# Patient Record
Sex: FEMALE | Race: WHITE | NOT HISPANIC OR LATINO
[De-identification: names, ages, dates, MRNs, and addresses within clinical notes are randomized per-mention and may not be internally consistent; named-entity substitution may affect disease eponyms.]

---

## 2019-08-30 ENCOUNTER — APPOINTMENT (OUTPATIENT)
Dept: ORTHOPEDIC SURGERY | Facility: CLINIC | Age: 64
End: 2019-08-30
Payer: MEDICARE

## 2019-08-30 VITALS
BODY MASS INDEX: 24.8 KG/M2 | SYSTOLIC BLOOD PRESSURE: 172 MMHG | DIASTOLIC BLOOD PRESSURE: 90 MMHG | HEART RATE: 66 BPM | HEIGHT: 63 IN | WEIGHT: 140 LBS

## 2019-08-30 PROCEDURE — 99204 OFFICE O/P NEW MOD 45 MIN: CPT

## 2019-08-30 NOTE — ADDENDUM
[FreeTextEntry1] :  This note was authored by Viridiana Massey working as a medical scribe for Dr. Sriram Pedro. The note was reviewed, edited, and revised by Dr. Sriram Pedro whom is in agreement with the exam findings, imaging findings, and treatment plan. 08/14/2019.

## 2019-08-30 NOTE — PHYSICAL EXAM
[UE/LE] : Sensory: Intact in bilateral upper & lower extremities [ALL] : dorsalis pedis, posterior tibial, femoral, popliteal, and radial 2+ and symmetric bilaterally [Normal] : Oriented to person, place, and time, insight and judgement were intact and the affect was normal [Antalgic] : antalgic [Poor Appearance] : well-appearing [Acute Distress] : not in acute distress [de-identified] : 5 out of 5 motor strength, sensation is intact and symmetrical full range of motion flexion extension and rotation, no palpatory tenderness full range of motion of hips knees shoulders and elbows (all four extremities), no atrophy, negative straight leg raise, no pathological reflexes, no swelling, normal ambulation, no apparent distress skin is intact, no palpable lymph nodes, no upper or lower extremity instability, alert and oriented x3 and normal mood. Normal finger-to nose test. Cane. [de-identified] : MRI Lumbar on 08/22/2019 at [facility]: Slight retrolisthesis at L2-3 and L3-4 and multilevel degenerative disc disease with multiple disc herniations resulting in impingement upon the left exiting L2 nerve root at L2-3, impinging upon the left L4 and left exiting L3 nerve roots at L3-4, impingement upon the left L5 nerve root with encroachment upon the left exiting L4 nerve root at L4-5 and encroachment upon the left exiting L5-S1 with asymmetric left neural foraminal narrowing throughout the lumbar spine without acute fracture or pars defect. \par 2. Neural foraminal narrowing appears most severe on the left at L3-4.

## 2019-08-30 NOTE — DISCUSSION/SUMMARY
[Surgical risks reviewed] : Surgical risks reviewed [de-identified] : Left L3-4 herniation\par We discussed all options. \par wants SNRB\par if no better surgery\par Risks of surgery include infection, dural tear, nerve root injury, reherniation, future back pain, future leg pain, retained fragment, hematoma, urinary retention, worsening leg symptoms, footdrop, anesthetic risks, blood transfusion risks, positioning pain, visceral and vascular injury, deep vein thrombosis, pulmonary embolus, and death. All risks were explained not exclusive to the ones mentioned alternatives were discussed and all questions were answered the patient agrees and understands the above and is in complete agreement with the plan. \par All options discussed including rest, medicine, home exercise, acupuncture, Chiropractic care, Physical Therapy, Pain management, and last resort surgery. \par All questions were answered, all alternatives discussed and the patient is in complete agreement with that plan. Follow-up appointment as instructed. Any issues and the patient will call or come in sooner.

## 2019-08-30 NOTE — HISTORY OF PRESENT ILLNESS
[Improving] : improving [de-identified] : 64 year old female c/o \par Had MRI at [facility] 08/22/2019. \par No fever chills sweats nausea vomiting no bowel or bladder dysfunction, no recent weight loss or gain no night pain. This history is in addition to the intake form that I personally reviewed. \par left thigh pain-1 month\par Avascular necrosis hips and shoulder from steroids-Chron's Disease.\par Dr. Meng-one injection.\par was in wheelchair now cane

## 2019-09-16 ENCOUNTER — APPOINTMENT (OUTPATIENT)
Dept: ORTHOPEDIC SURGERY | Facility: CLINIC | Age: 64
End: 2019-09-16
Payer: MEDICARE

## 2019-09-16 VITALS
BODY MASS INDEX: 24.8 KG/M2 | HEART RATE: 76 BPM | DIASTOLIC BLOOD PRESSURE: 77 MMHG | HEIGHT: 63 IN | WEIGHT: 140 LBS | SYSTOLIC BLOOD PRESSURE: 161 MMHG

## 2019-09-16 PROCEDURE — 99214 OFFICE O/P EST MOD 30 MIN: CPT

## 2019-09-16 NOTE — DISCUSSION/SUMMARY
[Surgical risks reviewed] : Surgical risks reviewed [de-identified] : Left L3-4 herniation.\par getting better\par wants another injection\par We discussed all options. \par wants SNRB\par if no better surgery\par Risks of surgery include infection, dural tear, nerve root injury, reherniation, future back pain, future leg pain, retained fragment, hematoma, urinary retention, worsening leg symptoms, footdrop, anesthetic risks, blood transfusion risks, positioning pain, visceral and vascular injury, deep vein thrombosis, pulmonary embolus, and death. All risks were explained not exclusive to the ones mentioned alternatives were discussed and all questions were answered the patient agrees and understands the above and is in complete agreement with the plan. \par wants another injection\par All options discussed including rest, medicine, home exercise, acupuncture, Chiropractic care, Physical Therapy, Pain management, and last resort surgery. \par All questions were answered, all alternatives discussed and the patient is in complete agreement with that plan. Follow-up appointment as instructed. Any issues and the patient will call or come in sooner.

## 2019-09-16 NOTE — HISTORY OF PRESENT ILLNESS
[Improving] : improving [de-identified] : 64 year old female c/o \par Had SNRB injection with Dr. Roman x 1 much better.\par Will be going in for another injection this week\par No fever chills sweats nausea vomiting no bowel or bladder dysfunction, no recent weight loss or gain no night pain. This history is in addition to the intake form that I personally reviewed. 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 Meng-one injection.\par was in wheelchair no cane anymore\par No fever chills sweats nausea vomiting no bowel or bladder dysfunction, no recent weight loss or gain no night pain. This history is in addition to the intake form that I personally reviewed.

## 2019-09-16 NOTE — PHYSICAL EXAM
[Antalgic] : antalgic [UE/LE] : Sensory: Intact in bilateral upper & lower extremities [ALL] : dorsalis pedis, posterior tibial, femoral, popliteal, and radial 2+ and symmetric bilaterally [Normal] : Gait: normal [SLR] : negative straight leg raise [Poor Appearance] : well-appearing [Acute Distress] : not in acute distress [de-identified] : 5 out of 5 motor strength, sensation is intact and symmetrical full range of motion flexion extension and rotation, no palpatory tenderness full range of motion of hips knees shoulders and elbows (all four extremities), no atrophy, negative straight leg raise, no pathological reflexes, no swelling, normal ambulation, no apparent distress skin is intact, no palpable lymph nodes, no upper or lower extremity instability, alert and oriented x3 and normal mood. Normal finger-to nose test.  [de-identified] : MRI Lumbar on 08/22/2019 at [facility]: Slight retrolisthesis at L2-3 and L3-4 and multilevel degenerative disc disease with multiple disc herniations resulting in impingement upon the left exiting L2 nerve root at L2-3, impinging upon the left L4 and left exiting L3 nerve roots at L3-4, impingement upon the left L5 nerve root with encroachment upon the left exiting L4 nerve root at L4-5 and encroachment upon the left exiting L5-S1 with asymmetric left neural foraminal narrowing throughout the lumbar spine without acute fracture or pars defect. \par 2. Neural foraminal narrowing appears most severe on the left at L3-4.

## 2019-09-23 ENCOUNTER — APPOINTMENT (OUTPATIENT)
Dept: ORTHOPEDIC SURGERY | Facility: CLINIC | Age: 64
End: 2019-09-23
Payer: MEDICARE

## 2019-09-23 VITALS — DIASTOLIC BLOOD PRESSURE: 80 MMHG | HEART RATE: 74 BPM | SYSTOLIC BLOOD PRESSURE: 157 MMHG

## 2019-09-23 PROCEDURE — 99214 OFFICE O/P EST MOD 30 MIN: CPT

## 2019-09-23 PROCEDURE — 72100 X-RAY EXAM L-S SPINE 2/3 VWS: CPT

## 2019-09-23 RX ORDER — PANTOPRAZOLE 40 MG/1
40 TABLET, DELAYED RELEASE ORAL
Qty: 90 | Refills: 0 | Status: ACTIVE | COMMUNITY
Start: 2019-08-12

## 2019-09-23 RX ORDER — METOPROLOL SUCCINATE 50 MG/1
50 TABLET, EXTENDED RELEASE ORAL
Qty: 90 | Refills: 0 | Status: ACTIVE | COMMUNITY
Start: 2019-07-11

## 2019-09-23 RX ORDER — ALPRAZOLAM 0.5 MG/1
0.5 TABLET ORAL
Qty: 90 | Refills: 0 | Status: ACTIVE | COMMUNITY
Start: 2019-07-10

## 2019-10-07 ENCOUNTER — APPOINTMENT (OUTPATIENT)
Dept: ORTHOPEDIC SURGERY | Facility: CLINIC | Age: 64
End: 2019-10-07
Payer: MEDICARE

## 2019-10-07 ENCOUNTER — APPOINTMENT (OUTPATIENT)
Dept: ORTHOPEDIC SURGERY | Facility: CLINIC | Age: 64
End: 2019-10-07

## 2019-10-07 VITALS — SYSTOLIC BLOOD PRESSURE: 136 MMHG | DIASTOLIC BLOOD PRESSURE: 74 MMHG | HEART RATE: 75 BPM

## 2019-10-07 PROCEDURE — 99214 OFFICE O/P EST MOD 30 MIN: CPT

## 2019-10-07 NOTE — DISCUSSION/SUMMARY
[Surgical risks reviewed] : Surgical risks reviewed [de-identified] : lumbar radiculopathy left\par surgery discussed\par left L4 radiculopathy\par She has a disc herniation on the left at L3-4 as well impinging the L3 nerve root.\par She failed all conservative treatment including medications, injections, and therapy.\par She is a nonsmoker.\par The herniation is far lateral underneath the facet joint and a facetectomy will be needed to decompress the neural elements and therefore fusion would be indicated.\par Surgery will entail a lumbar decompressive procedure and fusion with possible instrumentation, local autograft bone, and demineralized bone matrix. Spinal cord monitoring will be used.\par Risks of surgery include infection, dural tear, nerve root injury, reherniation, future back pain, future leg pain, retained fragment, hematoma, urinary retention, worsening leg symptoms, footdrop, inability to place hardware, hardware breakage, nonunion, adjacent level breakdown requiring surgery, anesthetic risks, blood transfusion risks, positioning pain, visceral and vascular injury, deep vein thrombosis, pulmonary embolus, and death. Somatosensory evoked potentials and EMG monitoring will be used. Patient will need spinal orthosis pre and post operatively. All risks were explained not exclusive to the ones mentioned alternatives were discussed and all questions were answered the patient agrees and understands the above and is in complete agreement with the plan. \par risks discussed\par website given and reviewed\par Surgeries tentatively scheduled October 22.\par Her  agrees with the plan.\par Risks of surgery include infection, dural tear, nerve root injury, reherniation, future back pain, future leg pain, retained fragment, hematoma, urinary retention, worsening leg symptoms, footdrop, anesthetic risks, blood transfusion risks, positioning pain, visceral and vascular injury, deep vein thrombosis, pulmonary embolus, and death. All risks were explained not exclusive to the ones mentioned alternatives were discussed and all questions were answered the patient agrees and understands the above and is in complete agreement with the plan.

## 2019-10-07 NOTE — CONSULT LETTER
[Dear  ___] : Dear  [unfilled], [Please see my note below.] : Please see my note below. [Sincerely,] : Sincerely, [FreeTextEntry3] : Dr. Bonilla

## 2019-10-07 NOTE — HISTORY OF PRESENT ILLNESS
[Worsening] : worsening [de-identified] : 64 year old female returns for review lumbar MRI \par Had SNRB injection with Dr. Roman x 2 -- most recently 3 weeks ago\par First injection was very helpful and the most recent injection was not.\par She's miserable and worsening with a decreased quality of life and limping 82 left thigh pain.\par Still with numbness and pain in left anterior thigh and slightly below the knee \par No fever chills sweats nausea vomiting no bowel or bladder dysfunction, no recent weight loss or gain no night pain. This history is in addition to the intake form that I personally reviewed. 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 Meng-one injection.\par was in wheelchair no cane anymore\par No fever chills sweats nausea vomiting no bowel or bladder dysfunction, no recent weight loss or gain no night pain. This history is in addition to the intake form that I personally reviewed.

## 2019-10-07 NOTE — ADDENDUM
[FreeTextEntry1] : I, Moises Ruiz, acted solely as a scribe for Dr. Pedro on 10/07/2019  .\par  All medical record entries made by the scribe were at my, Dr. Pedro, direction and personally dictated by me on 10/07/2019. I have reviewed the chart and agree that the record accurately reflects my personal performance of the history, physical exam, assessment and plan. I have also personally directed, reviewed, and agreed with the chart.\par

## 2019-10-07 NOTE — PHYSICAL EXAM
[Antalgic] : antalgic [de-identified] : Decreased left patellar reflex with weakness in her left quadriceps otherwise, 5 out of 5 motor strength, sensation is intact and symmetrical full range of motion flexion extension and rotation, no palpatory tenderness full range of motion of hips knees shoulders and elbows (all four extremities), no atrophy, negative straight leg raise, no pathological reflexes, no swelling, normal ambulation, no apparent distress skin is intact, no palpable lymph nodes, no upper or lower extremity instability, alert and oriented x3 and normal mood. Normal finger-to nose test.\par  [de-identified] : MRI 8/22/2019\par 1) slight retrolisthesis at L2/L3 and L3/L4 and multilevel DDD with multiple disc herniations resulting in impingement upon the left exiting L2 nerve root at L2/L3, impingement upon the left L4 and left exiting L3 nerve roots at L3/L4, impingement upon the left L5 nerve root with encroachment upon the left exiting L5 nerve root at L5/S1 with asymmetric left neural foraminal narrowing throughout the lumbar spine without acute fx on pars defect. \par 2) neural foraminal narrowing appears most sever on the left at L3/L4 \par AP lateral lumbar shows no obvious instability she does have a mild scoliosis-reviewed with the patient.

## 2019-10-08 ENCOUNTER — OUTPATIENT (OUTPATIENT)
Dept: OUTPATIENT SERVICES | Facility: HOSPITAL | Age: 64
LOS: 1 days | End: 2019-10-08
Payer: MEDICARE

## 2019-10-08 VITALS
HEART RATE: 66 BPM | SYSTOLIC BLOOD PRESSURE: 130 MMHG | DIASTOLIC BLOOD PRESSURE: 82 MMHG | WEIGHT: 139.99 LBS | TEMPERATURE: 97 F | HEIGHT: 61 IN | RESPIRATION RATE: 16 BRPM

## 2019-10-08 DIAGNOSIS — M48.07 SPINAL STENOSIS, LUMBOSACRAL REGION: ICD-10-CM

## 2019-10-08 DIAGNOSIS — Z96.612 PRESENCE OF LEFT ARTIFICIAL SHOULDER JOINT: Chronic | ICD-10-CM

## 2019-10-08 DIAGNOSIS — M54.9 DORSALGIA, UNSPECIFIED: ICD-10-CM

## 2019-10-08 DIAGNOSIS — Z96.649 PRESENCE OF UNSPECIFIED ARTIFICIAL HIP JOINT: Chronic | ICD-10-CM

## 2019-10-08 DIAGNOSIS — Z90.721 ACQUIRED ABSENCE OF OVARIES, UNILATERAL: Chronic | ICD-10-CM

## 2019-10-08 LAB
ANION GAP SERPL CALC-SCNC: 14 MMO/L — SIGNIFICANT CHANGE UP (ref 7–14)
BLD GP AB SCN SERPL QL: NEGATIVE — SIGNIFICANT CHANGE UP
BUN SERPL-MCNC: 22 MG/DL — SIGNIFICANT CHANGE UP (ref 7–23)
CALCIUM SERPL-MCNC: 9.5 MG/DL — SIGNIFICANT CHANGE UP (ref 8.4–10.5)
CHLORIDE SERPL-SCNC: 102 MMOL/L — SIGNIFICANT CHANGE UP (ref 98–107)
CO2 SERPL-SCNC: 23 MMOL/L — SIGNIFICANT CHANGE UP (ref 22–31)
CREAT SERPL-MCNC: 0.78 MG/DL — SIGNIFICANT CHANGE UP (ref 0.5–1.3)
GLUCOSE SERPL-MCNC: 81 MG/DL — SIGNIFICANT CHANGE UP (ref 70–99)
HCT VFR BLD CALC: 43.4 % — SIGNIFICANT CHANGE UP (ref 34.5–45)
HGB BLD-MCNC: 12.7 G/DL — SIGNIFICANT CHANGE UP (ref 11.5–15.5)
MCHC RBC-ENTMCNC: 26.7 PG — LOW (ref 27–34)
MCHC RBC-ENTMCNC: 29.3 % — LOW (ref 32–36)
MCV RBC AUTO: 91.2 FL — SIGNIFICANT CHANGE UP (ref 80–100)
NRBC # FLD: 0 K/UL — SIGNIFICANT CHANGE UP (ref 0–0)
PLATELET # BLD AUTO: 233 K/UL — SIGNIFICANT CHANGE UP (ref 150–400)
PMV BLD: 11.5 FL — SIGNIFICANT CHANGE UP (ref 7–13)
POTASSIUM SERPL-MCNC: 3.9 MMOL/L — SIGNIFICANT CHANGE UP (ref 3.5–5.3)
POTASSIUM SERPL-SCNC: 3.9 MMOL/L — SIGNIFICANT CHANGE UP (ref 3.5–5.3)
RBC # BLD: 4.76 M/UL — SIGNIFICANT CHANGE UP (ref 3.8–5.2)
RBC # FLD: 14.5 % — SIGNIFICANT CHANGE UP (ref 10.3–14.5)
RH IG SCN BLD-IMP: POSITIVE — SIGNIFICANT CHANGE UP
SODIUM SERPL-SCNC: 139 MMOL/L — SIGNIFICANT CHANGE UP (ref 135–145)
WBC # BLD: 6.14 K/UL — SIGNIFICANT CHANGE UP (ref 3.8–10.5)
WBC # FLD AUTO: 6.14 K/UL — SIGNIFICANT CHANGE UP (ref 3.8–10.5)

## 2019-10-08 PROCEDURE — 93010 ELECTROCARDIOGRAM REPORT: CPT

## 2019-10-08 RX ORDER — METOPROLOL TARTRATE 50 MG
1 TABLET ORAL
Qty: 0 | Refills: 0 | DISCHARGE

## 2019-10-08 RX ORDER — PANTOPRAZOLE SODIUM 20 MG/1
1 TABLET, DELAYED RELEASE ORAL
Qty: 0 | Refills: 0 | DISCHARGE

## 2019-10-08 NOTE — H&P PST ADULT - NSICDXPASTMEDICALHX_GEN_ALL_CORE_FT
PAST MEDICAL HISTORY:  Anxiety     AVN (avascular necrosis of bone)     Crohn's disease     GERD (gastroesophageal reflux disease)

## 2019-10-08 NOTE — H&P PST ADULT - NSICDXPROBLEM_GEN_ALL_CORE_FT
PROBLEM DIAGNOSES  Problem: Back pain  Assessment and Plan: Pt. is scheduled for a L4-5 laminectomy and fusion with instrumentation.  Pt. verbalized understanding of instructions as discussed and outlined on the instruction sheets.  Pt. did teach back on Chlorhexidine wash.  Pt. to have medical clearance.

## 2019-10-08 NOTE — H&P PST ADULT - GENITOURINARY MALE
Mod I / Independent - with use of device, as needed/appropriate   Balance Sitting: Good  (at EOB)  Standing: Fair  (without device)  Good dynamic standing balance during completion of ADLs and other functional tasks. Activity Tolerance Limited  Patient demonstrated shortness of breath following minimal functional mobility; O2 saturation was 89% following minimal activity and recovered to 92% with seated rest.  Patient will demonstrate Good understanding and consistent implementation of energy conservation techniques and work simplification techniques into ADL/IADL routines. Visual/  Perceptual Fair  Patient wears glasses for reading. N/A        Strength: ROM: Additional Information:    R UE  4-/5  WFL    L UE 4-/5  WFL      Hearing: WFL  Sensation: Patient denied experiencing numbness/tingling in B UEs. Tone: WFL  Edema: No    Comments/Treatment: Upon arrival, patient seated at EOB. At end of session, patient seated on couch (per patient preference) with all lines and tubes intact. Patient would benefit from continued skilled OT to increase safety and independence with completion of ADL/IADL tasks for functional independence and quality of life. Patient education provided regardin) energy conservation techniques. Patient indicated understanding. Eval Complexity: Low    Assessment of Current Deficits:   Functional Mobility ? ADLs ? Strength ? Cognition ? Functional Transfers  ? IADLs ? Safety Awareness ? Endurance ? Fine Motor Coordination ? Balance ? Vision/Perception ? Sensation ? Gross Motor Coordination ? ROM ? Delirium ? Motor Control ? Plan of Care:   ADL Retraining ? Equipment Needs ? Neuromuscular Re-Education ? Energy Conservation Techniques ? Functional Transfer Training ? Patient and/or Family Education ? Functional Mobility Training ? Environmental Modifications ? Cognitive Re-Training ? Compensatory Techniques for ADLs ? Splinting Needs ?    Positioning not applicable

## 2019-10-08 NOTE — H&P PST ADULT - NSICDXPASTSURGICALHX_GEN_ALL_CORE_FT
PAST SURGICAL HISTORY:  History of right oophorectomy 1985    History of total hip replacement right-9/23/2010, left-8/29/2018    S/P shoulder replacement, left 4/11/2019

## 2019-10-08 NOTE — H&P PST ADULT - NSANTHOSAYNRD_GEN_A_CORE
No. DINESH screening performed.  STOP BANG Legend: 0-2 = LOW Risk; 3-4 = INTERMEDIATE Risk; 5-8 = HIGH Risk

## 2019-10-10 LAB — SPECIMEN SOURCE: SIGNIFICANT CHANGE UP

## 2019-10-11 PROBLEM — F41.9 ANXIETY DISORDER, UNSPECIFIED: Chronic | Status: ACTIVE | Noted: 2019-10-08

## 2019-10-11 PROBLEM — K21.9 GASTRO-ESOPHAGEAL REFLUX DISEASE WITHOUT ESOPHAGITIS: Chronic | Status: ACTIVE | Noted: 2019-10-08

## 2019-10-11 PROBLEM — K50.90 CROHN'S DISEASE, UNSPECIFIED, WITHOUT COMPLICATIONS: Chronic | Status: ACTIVE | Noted: 2019-10-08

## 2019-10-11 LAB — BACTERIA NPH CULT: SIGNIFICANT CHANGE UP

## 2019-10-12 ENCOUNTER — TRANSCRIPTION ENCOUNTER (OUTPATIENT)
Age: 64
End: 2019-10-12

## 2019-10-21 NOTE — ASU PATIENT PROFILE, ADULT - PMH
Anxiety    AVN (avascular necrosis of bone)    Crohn's disease    GERD (gastroesophageal reflux disease)

## 2019-10-21 NOTE — ASU PATIENT PROFILE, ADULT - PSH
History of right oophorectomy  1985  History of total hip replacement  right-9/23/2010, left-8/29/2018  S/P shoulder replacement, left  4/11/2019

## 2019-10-21 NOTE — ASU PATIENT PROFILE, ADULT - VISION (WITH CORRECTIVE LENSES IF THE PATIENT USUALLY WEARS THEM):
wears eyeglasses for reading and driving Partially impaired: cannot see medication labels or newsprint, but can see obstacles in path, and the surrounding layout; can count fingers at arm's length/wears eyeglasses for reading and driving

## 2019-10-22 ENCOUNTER — INPATIENT (INPATIENT)
Facility: HOSPITAL | Age: 64
LOS: 0 days | Discharge: HOME | End: 2019-10-23
Attending: ORTHOPAEDIC SURGERY | Admitting: ORTHOPAEDIC SURGERY
Payer: MEDICARE

## 2019-10-22 ENCOUNTER — RESULT REVIEW (OUTPATIENT)
Age: 64
End: 2019-10-22

## 2019-10-22 ENCOUNTER — APPOINTMENT (OUTPATIENT)
Dept: ORTHOPEDIC SURGERY | Facility: HOSPITAL | Age: 64
End: 2019-10-22
Payer: MEDICARE

## 2019-10-22 VITALS
SYSTOLIC BLOOD PRESSURE: 130 MMHG | WEIGHT: 139.99 LBS | HEIGHT: 61 IN | DIASTOLIC BLOOD PRESSURE: 52 MMHG | HEART RATE: 65 BPM | OXYGEN SATURATION: 98 % | RESPIRATION RATE: 16 BRPM | TEMPERATURE: 98 F

## 2019-10-22 DIAGNOSIS — Z90.721 ACQUIRED ABSENCE OF OVARIES, UNILATERAL: Chronic | ICD-10-CM

## 2019-10-22 DIAGNOSIS — M48.07 SPINAL STENOSIS, LUMBOSACRAL REGION: ICD-10-CM

## 2019-10-22 DIAGNOSIS — Z96.612 PRESENCE OF LEFT ARTIFICIAL SHOULDER JOINT: Chronic | ICD-10-CM

## 2019-10-22 DIAGNOSIS — Z96.649 PRESENCE OF UNSPECIFIED ARTIFICIAL HIP JOINT: Chronic | ICD-10-CM

## 2019-10-22 LAB
ANION GAP SERPL CALC-SCNC: 11 MMO/L — SIGNIFICANT CHANGE UP (ref 7–14)
BASOPHILS # BLD AUTO: 0.01 K/UL — SIGNIFICANT CHANGE UP (ref 0–0.2)
BASOPHILS NFR BLD AUTO: 0.2 % — SIGNIFICANT CHANGE UP (ref 0–2)
BUN SERPL-MCNC: 14 MG/DL — SIGNIFICANT CHANGE UP (ref 7–23)
CALCIUM SERPL-MCNC: 8.9 MG/DL — SIGNIFICANT CHANGE UP (ref 8.4–10.5)
CHLORIDE SERPL-SCNC: 106 MMOL/L — SIGNIFICANT CHANGE UP (ref 98–107)
CO2 SERPL-SCNC: 23 MMOL/L — SIGNIFICANT CHANGE UP (ref 22–31)
CREAT SERPL-MCNC: 0.78 MG/DL — SIGNIFICANT CHANGE UP (ref 0.5–1.3)
EOSINOPHIL # BLD AUTO: 0.05 K/UL — SIGNIFICANT CHANGE UP (ref 0–0.5)
EOSINOPHIL NFR BLD AUTO: 1.2 % — SIGNIFICANT CHANGE UP (ref 0–6)
GLUCOSE SERPL-MCNC: 107 MG/DL — HIGH (ref 70–99)
HCT VFR BLD CALC: 36.7 % — SIGNIFICANT CHANGE UP (ref 34.5–45)
HGB BLD-MCNC: 11.3 G/DL — LOW (ref 11.5–15.5)
IMM GRANULOCYTES NFR BLD AUTO: 1.4 % — SIGNIFICANT CHANGE UP (ref 0–1.5)
LYMPHOCYTES # BLD AUTO: 1.54 K/UL — SIGNIFICANT CHANGE UP (ref 1–3.3)
LYMPHOCYTES # BLD AUTO: 36.1 % — SIGNIFICANT CHANGE UP (ref 13–44)
MCHC RBC-ENTMCNC: 27.5 PG — SIGNIFICANT CHANGE UP (ref 27–34)
MCHC RBC-ENTMCNC: 30.8 % — LOW (ref 32–36)
MCV RBC AUTO: 89.3 FL — SIGNIFICANT CHANGE UP (ref 80–100)
MONOCYTES # BLD AUTO: 0.31 K/UL — SIGNIFICANT CHANGE UP (ref 0–0.9)
MONOCYTES NFR BLD AUTO: 7.3 % — SIGNIFICANT CHANGE UP (ref 2–14)
NEUTROPHILS # BLD AUTO: 2.3 K/UL — SIGNIFICANT CHANGE UP (ref 1.8–7.4)
NEUTROPHILS NFR BLD AUTO: 53.8 % — SIGNIFICANT CHANGE UP (ref 43–77)
NRBC # FLD: 0 K/UL — SIGNIFICANT CHANGE UP (ref 0–0)
PLATELET # BLD AUTO: 199 K/UL — SIGNIFICANT CHANGE UP (ref 150–400)
PMV BLD: 11.1 FL — SIGNIFICANT CHANGE UP (ref 7–13)
POTASSIUM SERPL-MCNC: 3.9 MMOL/L — SIGNIFICANT CHANGE UP (ref 3.5–5.3)
POTASSIUM SERPL-SCNC: 3.9 MMOL/L — SIGNIFICANT CHANGE UP (ref 3.5–5.3)
RBC # BLD: 4.11 M/UL — SIGNIFICANT CHANGE UP (ref 3.8–5.2)
RBC # FLD: 14 % — SIGNIFICANT CHANGE UP (ref 10.3–14.5)
RH IG SCN BLD-IMP: POSITIVE — SIGNIFICANT CHANGE UP
SODIUM SERPL-SCNC: 140 MMOL/L — SIGNIFICANT CHANGE UP (ref 135–145)
WBC # BLD: 4.27 K/UL — SIGNIFICANT CHANGE UP (ref 3.8–10.5)
WBC # FLD AUTO: 4.27 K/UL — SIGNIFICANT CHANGE UP (ref 3.8–10.5)

## 2019-10-22 PROCEDURE — 88304 TISSUE EXAM BY PATHOLOGIST: CPT | Mod: 26

## 2019-10-22 PROCEDURE — 63030 LAMOT DCMPRN NRV RT 1 LMBR: CPT | Mod: 82,59

## 2019-10-22 PROCEDURE — 63047 LAM FACETEC & FORAMOT LUMBAR: CPT | Mod: 82

## 2019-10-22 PROCEDURE — 72100 X-RAY EXAM L-S SPINE 2/3 VWS: CPT | Mod: 26

## 2019-10-22 RX ORDER — ONDANSETRON 8 MG/1
8 TABLET, FILM COATED ORAL ONCE
Refills: 0 | Status: DISCONTINUED | OUTPATIENT
Start: 2019-10-22 | End: 2019-10-22

## 2019-10-22 RX ORDER — HYDROMORPHONE HYDROCHLORIDE 2 MG/ML
0.5 INJECTION INTRAMUSCULAR; INTRAVENOUS; SUBCUTANEOUS
Refills: 0 | Status: DISCONTINUED | OUTPATIENT
Start: 2019-10-22 | End: 2019-10-22

## 2019-10-22 RX ORDER — CEFAZOLIN SODIUM 1 G
2000 VIAL (EA) INJECTION EVERY 8 HOURS
Refills: 0 | Status: COMPLETED | OUTPATIENT
Start: 2019-10-22 | End: 2019-10-23

## 2019-10-22 RX ORDER — SODIUM CHLORIDE 9 MG/ML
1000 INJECTION, SOLUTION INTRAVENOUS
Refills: 0 | Status: DISCONTINUED | OUTPATIENT
Start: 2019-10-22 | End: 2019-10-22

## 2019-10-22 RX ORDER — INFLUENZA VIRUS VACCINE 15; 15; 15; 15 UG/.5ML; UG/.5ML; UG/.5ML; UG/.5ML
0.5 SUSPENSION INTRAMUSCULAR ONCE
Refills: 0 | Status: DISCONTINUED | OUTPATIENT
Start: 2019-10-22 | End: 2019-10-23

## 2019-10-22 RX ORDER — ALPRAZOLAM 0.25 MG
0.5 TABLET ORAL THREE TIMES A DAY
Refills: 0 | Status: DISCONTINUED | OUTPATIENT
Start: 2019-10-22 | End: 2019-10-23

## 2019-10-22 RX ORDER — ONDANSETRON 8 MG/1
4 TABLET, FILM COATED ORAL EVERY 6 HOURS
Refills: 0 | Status: DISCONTINUED | OUTPATIENT
Start: 2019-10-22 | End: 2019-10-23

## 2019-10-22 RX ORDER — ACETAMINOPHEN 500 MG
650 TABLET ORAL EVERY 6 HOURS
Refills: 0 | Status: DISCONTINUED | OUTPATIENT
Start: 2019-10-22 | End: 2019-10-23

## 2019-10-22 RX ORDER — OXYCODONE HYDROCHLORIDE 5 MG/1
5 TABLET ORAL ONCE
Refills: 0 | Status: DISCONTINUED | OUTPATIENT
Start: 2019-10-22 | End: 2019-10-22

## 2019-10-22 RX ORDER — OXYCODONE HYDROCHLORIDE 5 MG/1
10 TABLET ORAL EVERY 4 HOURS
Refills: 0 | Status: DISCONTINUED | OUTPATIENT
Start: 2019-10-22 | End: 2019-10-23

## 2019-10-22 RX ORDER — HYDROMORPHONE HYDROCHLORIDE 2 MG/ML
0.5 INJECTION INTRAMUSCULAR; INTRAVENOUS; SUBCUTANEOUS EVERY 4 HOURS
Refills: 0 | Status: DISCONTINUED | OUTPATIENT
Start: 2019-10-22 | End: 2019-10-23

## 2019-10-22 RX ORDER — OXYCODONE HYDROCHLORIDE 5 MG/1
5 TABLET ORAL EVERY 4 HOURS
Refills: 0 | Status: DISCONTINUED | OUTPATIENT
Start: 2019-10-22 | End: 2019-10-23

## 2019-10-22 RX ORDER — SODIUM CHLORIDE 9 MG/ML
1000 INJECTION, SOLUTION INTRAVENOUS
Refills: 0 | Status: DISCONTINUED | OUTPATIENT
Start: 2019-10-22 | End: 2019-10-23

## 2019-10-22 RX ORDER — FENTANYL CITRATE 50 UG/ML
50 INJECTION INTRAVENOUS
Refills: 0 | Status: DISCONTINUED | OUTPATIENT
Start: 2019-10-22 | End: 2019-10-22

## 2019-10-22 RX ORDER — MAGNESIUM HYDROXIDE 400 MG/1
30 TABLET, CHEWABLE ORAL EVERY 12 HOURS
Refills: 0 | Status: DISCONTINUED | OUTPATIENT
Start: 2019-10-22 | End: 2019-10-23

## 2019-10-22 RX ORDER — PANTOPRAZOLE SODIUM 20 MG/1
40 TABLET, DELAYED RELEASE ORAL
Refills: 0 | Status: DISCONTINUED | OUTPATIENT
Start: 2019-10-22 | End: 2019-10-23

## 2019-10-22 RX ORDER — METOPROLOL TARTRATE 50 MG
50 TABLET ORAL DAILY
Refills: 0 | Status: DISCONTINUED | OUTPATIENT
Start: 2019-10-22 | End: 2019-10-23

## 2019-10-22 RX ORDER — SENNA PLUS 8.6 MG/1
2 TABLET ORAL AT BEDTIME
Refills: 0 | Status: DISCONTINUED | OUTPATIENT
Start: 2019-10-22 | End: 2019-10-23

## 2019-10-22 RX ADMIN — HYDROMORPHONE HYDROCHLORIDE 0.5 MILLIGRAM(S): 2 INJECTION INTRAMUSCULAR; INTRAVENOUS; SUBCUTANEOUS at 16:32

## 2019-10-22 RX ADMIN — Medication 0.5 MILLIGRAM(S): at 23:13

## 2019-10-22 RX ADMIN — SODIUM CHLORIDE 125 MILLILITER(S): 9 INJECTION, SOLUTION INTRAVENOUS at 23:15

## 2019-10-22 RX ADMIN — OXYCODONE HYDROCHLORIDE 5 MILLIGRAM(S): 5 TABLET ORAL at 16:57

## 2019-10-22 RX ADMIN — HYDROMORPHONE HYDROCHLORIDE 0.5 MILLIGRAM(S): 2 INJECTION INTRAMUSCULAR; INTRAVENOUS; SUBCUTANEOUS at 17:13

## 2019-10-22 RX ADMIN — HYDROMORPHONE HYDROCHLORIDE 0.5 MILLIGRAM(S): 2 INJECTION INTRAMUSCULAR; INTRAVENOUS; SUBCUTANEOUS at 15:17

## 2019-10-22 RX ADMIN — SENNA PLUS 2 TABLET(S): 8.6 TABLET ORAL at 21:43

## 2019-10-22 RX ADMIN — HYDROMORPHONE HYDROCHLORIDE 0.5 MILLIGRAM(S): 2 INJECTION INTRAMUSCULAR; INTRAVENOUS; SUBCUTANEOUS at 16:28

## 2019-10-22 RX ADMIN — ONDANSETRON 4 MILLIGRAM(S): 8 TABLET, FILM COATED ORAL at 20:25

## 2019-10-22 RX ADMIN — Medication 100 MILLIGRAM(S): at 20:25

## 2019-10-22 RX ADMIN — OXYCODONE HYDROCHLORIDE 5 MILLIGRAM(S): 5 TABLET ORAL at 18:25

## 2019-10-22 NOTE — PATIENT PROFILE ADULT - NSPROSPIRITUALVALUESFT_GEN_A_NUR
Pt would prefer BP's to be taken on the Right hand vs to Lt hand d/t her Lt shoulder replacement surgery 6 months ago.

## 2019-10-22 NOTE — PROGRESS NOTE ADULT - SUBJECTIVE AND OBJECTIVE BOX
Patient seen and examined.   Surgical pain well controlled.   Denies numbness/tingling/paresthesias/weakness.   Denies headaches. Denies fevers/chills/CP,SOB/N/V.     HEALTH ISSUES - PROBLEM Dx:          MEDICATIONS  (STANDING):  ceFAZolin   IVPB 2000 milliGRAM(s) IV Intermittent every 8 hours  lactated ringers. 1000 milliLiter(s) IV Continuous <Continuous>  metoprolol succinate ER 50 milliGRAM(s) Oral daily  pantoprazole    Tablet 40 milliGRAM(s) Oral before breakfast  senna 2 Tablet(s) Oral at bedtime      Allergies    No Known Allergies    Intolerances        PAST MEDICAL & SURGICAL HISTORY:  Anxiety  GERD (gastroesophageal reflux disease)  Crohn's disease  AVN (avascular necrosis of bone)  History of right oophorectomy: 1985  S/P shoulder replacement, left: 4/11/2019  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, left-8/29/2018                            11.3   4.27  )-----------( 199      ( 22 Oct 2019 15:14 )             36.7       22 Oct 2019 15:14    140    |  106    |  14     ----------------------------<  107    3.9     |  23     |  0.78     Ca    8.9        22 Oct 2019 15:14                Vital Signs Last 24 Hrs  T(C): 36.3 (10-22-19 @ 14:35), Max: 36.9 (10-22-19 @ 10:13)  T(F): 97.3 (10-22-19 @ 14:35), Max: 98.4 (10-22-19 @ 10:13)  HR: 59 (10-22-19 @ 15:45) (59 - 67)  BP: 100/82 (10-22-19 @ 15:45) (99/64 - 130/52)  BP(mean): 87 (10-22-19 @ 15:45) (66 - 87)  RR: 19 (10-22-19 @ 15:45) (10 - 19)  SpO2: 100% (10-22-19 @ 15:45) (98% - 100%)    Gen: NAD    Spine PE:  Dressing clean dry intact      Motor:                             L2             L3             L4               L5            S1  R         5/5           5/5          5/5             5/5           5/5  L          5/5          5/5           5/5             5/5           5/5    Sensory:                         L2          L3         L4      L5       S1         (0=absent, 1=impaired, 2=normal, NT=not testable)  R         2            2            2        2        2  L          2            2           2        2         2      A/P: 65 y/o F s/p Discectomy.  Neurologically intact. No acute isssues.   Pain control  Neuro checks  WBAT/PT/OT/OOB  LSO with ambulation  FU Labs  PPI, bowel regimen  Inc spirometry  SCDs  Medical co-management appreciated  Dispo plan Patient seen and examined.   Surgical pain well controlled.   Denies numbness/tingling/paresthesias/weakness.   Denies headaches. Denies fevers/chills/CP,SOB/N/V.     HEALTH ISSUES - PROBLEM Dx:          MEDICATIONS  (STANDING):  ceFAZolin   IVPB 2000 milliGRAM(s) IV Intermittent every 8 hours  lactated ringers. 1000 milliLiter(s) IV Continuous <Continuous>  metoprolol succinate ER 50 milliGRAM(s) Oral daily  pantoprazole    Tablet 40 milliGRAM(s) Oral before breakfast  senna 2 Tablet(s) Oral at bedtime      Allergies    No Known Allergies    Intolerances        PAST MEDICAL & SURGICAL HISTORY:  Anxiety  GERD (gastroesophageal reflux disease)  Crohn's disease  AVN (avascular necrosis of bone)  History of right oophorectomy: 1985  S/P shoulder replacement, left: 4/11/2019  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, left-8/29/2018                            11.3   4.27  )-----------( 199      ( 22 Oct 2019 15:14 )             36.7       22 Oct 2019 15:14    140    |  106    |  14     ----------------------------<  107    3.9     |  23     |  0.78     Ca    8.9        22 Oct 2019 15:14                Vital Signs Last 24 Hrs  T(C): 36.3 (10-22-19 @ 14:35), Max: 36.9 (10-22-19 @ 10:13)  T(F): 97.3 (10-22-19 @ 14:35), Max: 98.4 (10-22-19 @ 10:13)  HR: 59 (10-22-19 @ 15:45) (59 - 67)  BP: 100/82 (10-22-19 @ 15:45) (99/64 - 130/52)  BP(mean): 87 (10-22-19 @ 15:45) (66 - 87)  RR: 19 (10-22-19 @ 15:45) (10 - 19)  SpO2: 100% (10-22-19 @ 15:45) (98% - 100%)    Gen: NAD    Spine PE:  Dressing clean dry intact      Motor:                             L2             L3             L4               L5            S1  R         5/5           5/5          5/5             5/5           5/5  L          5/5          5/5           5/5             5/5           5/5    Sensory:                         L2          L3         L4      L5       S1         (0=absent, 1=impaired, 2=normal, NT=not testable)  R         2            2            2        2        2  L          2            2           2        2         2      A/P: 65 y/o F s/p Discectomy.  Neurologically intact. No acute isssues.   Pain control  Neuro checks  WBAT/PT/OT/OOB  LSO with ambulation  FU Labs  PPI, bowel regimen  Inc spirometry  SCDs  Medical co-management appreciated  Dispo plan    I discussed and I agree with the above, Dr. Sriram Pedro.

## 2019-10-23 ENCOUNTER — TRANSCRIPTION ENCOUNTER (OUTPATIENT)
Age: 64
End: 2019-10-23

## 2019-10-23 ENCOUNTER — INBOUND DOCUMENT (OUTPATIENT)
Age: 64
End: 2019-10-23

## 2019-10-23 VITALS
OXYGEN SATURATION: 98 % | TEMPERATURE: 98 F | SYSTOLIC BLOOD PRESSURE: 104 MMHG | DIASTOLIC BLOOD PRESSURE: 51 MMHG | RESPIRATION RATE: 18 BRPM | HEART RATE: 64 BPM

## 2019-10-23 DIAGNOSIS — Z29.9 ENCOUNTER FOR PROPHYLACTIC MEASURES, UNSPECIFIED: ICD-10-CM

## 2019-10-23 DIAGNOSIS — M87.00 IDIOPATHIC ASEPTIC NECROSIS OF UNSPECIFIED BONE: ICD-10-CM

## 2019-10-23 DIAGNOSIS — K21.9 GASTRO-ESOPHAGEAL REFLUX DISEASE WITHOUT ESOPHAGITIS: ICD-10-CM

## 2019-10-23 DIAGNOSIS — M51.26 OTHER INTERVERTEBRAL DISC DISPLACEMENT, LUMBAR REGION: ICD-10-CM

## 2019-10-23 DIAGNOSIS — K50.919 CROHN'S DISEASE, UNSPECIFIED, WITH UNSPECIFIED COMPLICATIONS: ICD-10-CM

## 2019-10-23 DIAGNOSIS — F41.9 ANXIETY DISORDER, UNSPECIFIED: ICD-10-CM

## 2019-10-23 LAB
ANION GAP SERPL CALC-SCNC: 11 MMO/L — SIGNIFICANT CHANGE UP (ref 7–14)
BASOPHILS # BLD AUTO: 0.03 K/UL — SIGNIFICANT CHANGE UP (ref 0–0.2)
BASOPHILS NFR BLD AUTO: 0.4 % — SIGNIFICANT CHANGE UP (ref 0–2)
BUN SERPL-MCNC: 12 MG/DL — SIGNIFICANT CHANGE UP (ref 7–23)
CALCIUM SERPL-MCNC: 8.7 MG/DL — SIGNIFICANT CHANGE UP (ref 8.4–10.5)
CHLORIDE SERPL-SCNC: 105 MMOL/L — SIGNIFICANT CHANGE UP (ref 98–107)
CO2 SERPL-SCNC: 25 MMOL/L — SIGNIFICANT CHANGE UP (ref 22–31)
CREAT SERPL-MCNC: 0.84 MG/DL — SIGNIFICANT CHANGE UP (ref 0.5–1.3)
EOSINOPHIL # BLD AUTO: 0.12 K/UL — SIGNIFICANT CHANGE UP (ref 0–0.5)
EOSINOPHIL NFR BLD AUTO: 1.7 % — SIGNIFICANT CHANGE UP (ref 0–6)
GLUCOSE SERPL-MCNC: 107 MG/DL — HIGH (ref 70–99)
HCT VFR BLD CALC: 35.9 % — SIGNIFICANT CHANGE UP (ref 34.5–45)
HGB BLD-MCNC: 11 G/DL — LOW (ref 11.5–15.5)
IMM GRANULOCYTES NFR BLD AUTO: 0.6 % — SIGNIFICANT CHANGE UP (ref 0–1.5)
LYMPHOCYTES # BLD AUTO: 1.56 K/UL — SIGNIFICANT CHANGE UP (ref 1–3.3)
LYMPHOCYTES # BLD AUTO: 22.3 % — SIGNIFICANT CHANGE UP (ref 13–44)
MCHC RBC-ENTMCNC: 27.4 PG — SIGNIFICANT CHANGE UP (ref 27–34)
MCHC RBC-ENTMCNC: 30.6 % — LOW (ref 32–36)
MCV RBC AUTO: 89.5 FL — SIGNIFICANT CHANGE UP (ref 80–100)
MONOCYTES # BLD AUTO: 0.42 K/UL — SIGNIFICANT CHANGE UP (ref 0–0.9)
MONOCYTES NFR BLD AUTO: 6 % — SIGNIFICANT CHANGE UP (ref 2–14)
NEUTROPHILS # BLD AUTO: 4.84 K/UL — SIGNIFICANT CHANGE UP (ref 1.8–7.4)
NEUTROPHILS NFR BLD AUTO: 69 % — SIGNIFICANT CHANGE UP (ref 43–77)
NRBC # FLD: 0 K/UL — SIGNIFICANT CHANGE UP (ref 0–0)
PLATELET # BLD AUTO: 192 K/UL — SIGNIFICANT CHANGE UP (ref 150–400)
PMV BLD: 11.1 FL — SIGNIFICANT CHANGE UP (ref 7–13)
POTASSIUM SERPL-MCNC: 4.2 MMOL/L — SIGNIFICANT CHANGE UP (ref 3.5–5.3)
POTASSIUM SERPL-SCNC: 4.2 MMOL/L — SIGNIFICANT CHANGE UP (ref 3.5–5.3)
RBC # BLD: 4.01 M/UL — SIGNIFICANT CHANGE UP (ref 3.8–5.2)
RBC # FLD: 13.9 % — SIGNIFICANT CHANGE UP (ref 10.3–14.5)
SODIUM SERPL-SCNC: 141 MMOL/L — SIGNIFICANT CHANGE UP (ref 135–145)
WBC # BLD: 7.01 K/UL — SIGNIFICANT CHANGE UP (ref 3.8–10.5)
WBC # FLD AUTO: 7.01 K/UL — SIGNIFICANT CHANGE UP (ref 3.8–10.5)

## 2019-10-23 PROCEDURE — 99223 1ST HOSP IP/OBS HIGH 75: CPT

## 2019-10-23 RX ORDER — CYCLOBENZAPRINE HYDROCHLORIDE 10 MG/1
1 TABLET, FILM COATED ORAL
Qty: 42 | Refills: 0
Start: 2019-10-23 | End: 2019-11-05

## 2019-10-23 RX ORDER — SODIUM CHLORIDE 9 MG/ML
1000 INJECTION, SOLUTION INTRAVENOUS ONCE
Refills: 0 | Status: COMPLETED | OUTPATIENT
Start: 2019-10-23 | End: 2019-10-23

## 2019-10-23 RX ORDER — CYCLOBENZAPRINE HYDROCHLORIDE 10 MG/1
5 TABLET, FILM COATED ORAL THREE TIMES A DAY
Refills: 0 | Status: DISCONTINUED | OUTPATIENT
Start: 2019-10-23 | End: 2019-10-23

## 2019-10-23 RX ORDER — OXYCODONE HYDROCHLORIDE 5 MG/1
1 TABLET ORAL
Qty: 42 | Refills: 0
Start: 2019-10-23 | End: 2019-10-29

## 2019-10-23 RX ORDER — SENNA PLUS 8.6 MG/1
2 TABLET ORAL
Qty: 0 | Refills: 0 | DISCHARGE
Start: 2019-10-23

## 2019-10-23 RX ORDER — OXYCODONE HYDROCHLORIDE 5 MG/1
5 TABLET ORAL
Refills: 0 | Status: DISCONTINUED | OUTPATIENT
Start: 2019-10-23 | End: 2019-10-23

## 2019-10-23 RX ORDER — OXYCODONE HYDROCHLORIDE 5 MG/1
10 TABLET ORAL
Refills: 0 | Status: COMPLETED | OUTPATIENT
Start: 2019-10-23 | End: 2019-10-23

## 2019-10-23 RX ADMIN — Medication 100 MILLIGRAM(S): at 04:05

## 2019-10-23 RX ADMIN — Medication 650 MILLIGRAM(S): at 03:19

## 2019-10-23 RX ADMIN — Medication 650 MILLIGRAM(S): at 09:23

## 2019-10-23 RX ADMIN — OXYCODONE HYDROCHLORIDE 5 MILLIGRAM(S): 5 TABLET ORAL at 12:42

## 2019-10-23 RX ADMIN — OXYCODONE HYDROCHLORIDE 5 MILLIGRAM(S): 5 TABLET ORAL at 10:14

## 2019-10-23 RX ADMIN — PANTOPRAZOLE SODIUM 40 MILLIGRAM(S): 20 TABLET, DELAYED RELEASE ORAL at 05:04

## 2019-10-23 RX ADMIN — OXYCODONE HYDROCHLORIDE 5 MILLIGRAM(S): 5 TABLET ORAL at 06:34

## 2019-10-23 RX ADMIN — Medication 650 MILLIGRAM(S): at 09:52

## 2019-10-23 RX ADMIN — OXYCODONE HYDROCHLORIDE 10 MILLIGRAM(S): 5 TABLET ORAL at 16:17

## 2019-10-23 RX ADMIN — OXYCODONE HYDROCHLORIDE 5 MILLIGRAM(S): 5 TABLET ORAL at 00:19

## 2019-10-23 RX ADMIN — CYCLOBENZAPRINE HYDROCHLORIDE 5 MILLIGRAM(S): 10 TABLET, FILM COATED ORAL at 10:58

## 2019-10-23 RX ADMIN — OXYCODONE HYDROCHLORIDE 5 MILLIGRAM(S): 5 TABLET ORAL at 13:12

## 2019-10-23 RX ADMIN — OXYCODONE HYDROCHLORIDE 5 MILLIGRAM(S): 5 TABLET ORAL at 01:04

## 2019-10-23 RX ADMIN — OXYCODONE HYDROCHLORIDE 5 MILLIGRAM(S): 5 TABLET ORAL at 09:44

## 2019-10-23 RX ADMIN — CYCLOBENZAPRINE HYDROCHLORIDE 5 MILLIGRAM(S): 10 TABLET, FILM COATED ORAL at 14:04

## 2019-10-23 RX ADMIN — OXYCODONE HYDROCHLORIDE 10 MILLIGRAM(S): 5 TABLET ORAL at 15:47

## 2019-10-23 RX ADMIN — Medication 650 MILLIGRAM(S): at 02:34

## 2019-10-23 RX ADMIN — SODIUM CHLORIDE 1000 MILLILITER(S): 9 INJECTION, SOLUTION INTRAVENOUS at 09:23

## 2019-10-23 NOTE — PROGRESS NOTE ADULT - SUBJECTIVE AND OBJECTIVE BOX
POST ANESTHESIA EVALUATION    64y Female POSTOP DAY 1  s/p L3 L4 discectomy    MENTAL STATUS: Patient participation [x  ] Awake     [  ] Arousable     [  ] Sedated    AIRWAY PATENCY: [ x ] Satisfactory  [  ] Other:     Vital Signs Last 24 Hrs  T(C): 36.8 (23 Oct 2019 13:32), Max: 36.8 (23 Oct 2019 01:14)  T(F): 98.3 (23 Oct 2019 13:32), Max: 98.3 (23 Oct 2019 13:32)  HR: 64 (23 Oct 2019 13:32) (55 - 75)  BP: 104/51 (23 Oct 2019 13:32) (100/77 - 129/70)  BP(mean): 76 (22 Oct 2019 21:00) (61 - 98)  RR: 18 (23 Oct 2019 13:32) (10 - 20)  SpO2: 98% (23 Oct 2019 13:32) (93% - 99%)  I&O's Summary    22 Oct 2019 07:01  -  23 Oct 2019 07:00  --------------------------------------------------------  IN: 855 mL / OUT: 1860 mL / NET: -1005 mL    23 Oct 2019 07:01  -  23 Oct 2019 16:11  --------------------------------------------------------  IN: 0 mL / OUT: 450 mL / NET: -450 mL          NAUSEA/ VOMITTING:  [  x] NONE  [  ] CONTROLLED [  ] OTHER     PAIN: [ x ] CONTROLLED WITH CURRENT REGIMEN  [  ] OTHER    [ x ] NO APPARENT ANESTHESIA COMPLICATIONS

## 2019-10-23 NOTE — PHYSICAL THERAPY INITIAL EVALUATION ADULT - GENERAL OBSERVATIONS, REHAB EVAL
Consult received, chart reviewed. Patient received supine in bed, no apparent distress,  present. Patient agreed to Evaluation from Physical Therapist.

## 2019-10-23 NOTE — DISCHARGE NOTE PROVIDER - HOSPITAL COURSE
The patient is a 63 y/o F PMH significant for Crohn's Disease, AVN, GERD, lumbar stenosis/HNP with radiculopathy status post uncomplicated  L3-L4 Discectomy 10/22/2019.   The patient tolerated the procedure well and was transferred to the floor in stable condition.  Remainder of hospital course was uneventful.  Surgical pain is controlled with current PO regimen.  Patient reports complete resolution of lower extremity pain and paresthesia.          Functional goals have been met with Physical Therapy.         Patient is neurologically intact; motor strength and sensation.  Medically cleared for discharge to home at this time.        Incision site is well approximated, clean/dry/intact.  No erythema/swelling/drainage.         Keep incision site clean/dry/intact at all times.  Change dressing as needed.  Materials provided.         Restrictions for at least 8 weeks; no strenuous activity.  No lift/push/pull/carry objects greater than 10lbs. No repetitive bending/lifting/twisting/squatting.  No prolonged sitting/standing/walking greater than 30 minutes.  Advance activity as tolerated.        LSO brace with ambulation and during transport.        Follow-up with Dr. Pedro in 7-10 days for routine post-operative visit.  Call [phone] to schedule appointment.

## 2019-10-23 NOTE — PROGRESS NOTE ADULT - SUBJECTIVE AND OBJECTIVE BOX
Orthopaedic Surgery Progress Note    Subjective:   Patient seen and examined  No acute events overnight    Objective:  T(C): 36.7 (10-23-19 @ 05:03), Max: 36.9 (10-22-19 @ 10:13)  HR: 65 (10-23-19 @ 05:03) (55 - 75)  BP: 108/57 (10-23-19 @ 05:03) (99/64 - 130/52)  RR: 18 (10-23-19 @ 05:03) (10 - 20)  SpO2: 97% (10-23-19 @ 05:03) (93% - 100%)  Wt(kg): --    10-22 @ 07:01  -  10-23 @ 06:30  --------------------------------------------------------  IN: 855 mL / OUT: 1140 mL / NET: -285 mL        PE    NAD  Back:   dressing C/D/I, mild appropriate keara-incisional ttp  Neuro:  RLE IP 5/5 HS 5/5 Q 5/5 GS 5/5 TA 5/5 EHL 5/5   SILT L2-S1  LLE IP 5/5 HS 5/5 Q 5/5 GS 5/5 TA 5/5 EHL 5/5  SILT L2-S1  WWP BLE                          11.0   7.01  )-----------( 192      ( 23 Oct 2019 05:30 )             35.9     10-22    140  |  106  |  14  ----------------------------<  107<H>  3.9   |  23  |  0.78    Ca    8.9      22 Oct 2019 15:14            64y Female s/p L3/4 Far lateral dsicectomy  - Pain control  - FU labs  - WBAT  - PT/OT/OOB  - I/S    - SCDs  - Dispo planning Orthopaedic Surgery Progress Note    Subjective:   Patient seen and examined  No acute events overnight    Objective:  T(C): 36.7 (10-23-19 @ 05:03), Max: 36.9 (10-22-19 @ 10:13)  HR: 65 (10-23-19 @ 05:03) (55 - 75)  BP: 108/57 (10-23-19 @ 05:03) (99/64 - 130/52)  RR: 18 (10-23-19 @ 05:03) (10 - 20)  SpO2: 97% (10-23-19 @ 05:03) (93% - 100%)  Wt(kg): --    10-22 @ 07:01  -  10-23 @ 06:30  --------------------------------------------------------  IN: 855 mL / OUT: 1140 mL / NET: -285 mL        PE    NAD  Back:   dressing C/D/I, mild appropriate keara-incisional ttp  Neuro:  RLE IP 5/5 HS 5/5 Q 5/5 GS 5/5 TA 5/5 EHL 5/5   SILT L2-S1  LLE IP 5/5 HS 5/5 Q 5/5 GS 5/5 TA 5/5 EHL 5/5  SILT L2-S1  WWP BLE                          11.0   7.01  )-----------( 192      ( 23 Oct 2019 05:30 )             35.9     10-22    140  |  106  |  14  ----------------------------<  107<H>  3.9   |  23  |  0.78    Ca    8.9      22 Oct 2019 15:14            64y Female s/p L3/4 Far lateral dsicectomy  - Pain control  - FU labs  - WBAT  - PT/OT/OOB  - I/S    - SCDs  - Dispo planning    Patient seen on rounds today. She is complete resolution of her preoperative left leg pain. Her incisional pain is controlled. I discussed and agree with the above, Dr. Sriram Pedro.

## 2019-10-23 NOTE — OCCUPATIONAL THERAPY INITIAL EVALUATION ADULT - GENERAL OBSERVATIONS, REHAB EVAL
Pt received semisupine in bed. Pt is alert and following directions. Family present at pt's bedside.

## 2019-10-23 NOTE — DISCHARGE NOTE PROVIDER - CARE PROVIDER_API CALL
Sriram Pedro (MD; DC)  Orthopaedic Surgery  [address] [facility] [address], [facility] [address]  [place] [address]  Phone: [phone]  Fax: [phone]  Follow Up Time:

## 2019-10-23 NOTE — PHYSICAL THERAPY INITIAL EVALUATION ADULT - CRITERIA FOR SKILLED THERAPEUTIC INTERVENTIONS
therapy frequency/impairments found/rehab potential/predicted duration of therapy intervention/anticipated discharge recommendation

## 2019-10-23 NOTE — OCCUPATIONAL THERAPY INITIAL EVALUATION ADULT - PERTINENT HX OF CURRENT PROBLEM, REHAB EVAL
Pt is a 64 y.o. female with Herniated nucleus pulposus, L3-4. Pt s/p far lateral discectomy posterior approach 10/22/19.

## 2019-10-23 NOTE — DISCHARGE NOTE NURSING/CASE MANAGEMENT/SOCIAL WORK - NSDCPNINST_GEN_ALL_CORE
Call MD for any complain of numbness, tingling, swelling to all extremities, redness or drainage to incision, fever and a return appointment.  Call office for a post op appointment.  Take stool softener to prevent constipation caused from taking pain medications.

## 2019-10-23 NOTE — CONSULT NOTE ADULT - ASSESSMENT
Patient is a 64yoF with h/o Crohn's disease, AVN, HTN, Anxiety and GERD a/w elective L3-L4 Discectomy

## 2019-10-23 NOTE — DISCHARGE NOTE PROVIDER - NSDCACTIVITY_GEN_ALL_CORE
Do not drive or operate machinery/Walking - Outdoors allowed/Showering allowed/Stairs allowed/Walking - Indoors allowed/Do not make important decisions/No heavy lifting/straining

## 2019-10-23 NOTE — CONSULT NOTE ADULT - SUBJECTIVE AND OBJECTIVE BOX
CHIEF COMPLAINT: Patient is a 64y old  Female who presents with a chief complaint of s/p L3-L4 Discectomy (23 Oct 2019 14:45)      HPI:     Patient is a 64yoF who is admitted for elective L3-4 discectomy.  Patient reports having pain since July. She believes she hurt her back while going to PT for her shoulder. She tried epidurals without much relief and wants to avoid steroids given h/o Crohn's with AVN due to recurrent steroid use.  patient seen post-operatively. Fels well currently.    Allergies    No Known Allergies    Intolerances        HOME MEDICATIONS: [x] Reviewed    MEDICATIONS  (STANDING):  cyclobenzaprine 5 milliGRAM(s) Oral three times a day  influenza   Vaccine 0.5 milliLiter(s) IntraMuscular once  metoprolol succinate ER 50 milliGRAM(s) Oral daily  oxyCODONE    IR 10 milliGRAM(s) Oral every 3 hours  pantoprazole    Tablet 40 milliGRAM(s) Oral before breakfast  senna 2 Tablet(s) Oral at bedtime    MEDICATIONS  (PRN):  ALPRAZolam 0.5 milliGRAM(s) Oral three times a day PRN Anxiety  magnesium hydroxide Suspension 30 milliLiter(s) Oral every 12 hours PRN Constipation  ondansetron Injectable 4 milliGRAM(s) IV Push every 6 hours PRN Nausea      PAST MEDICAL & SURGICAL HISTORY:  Anxiety  GERD (gastroesophageal reflux disease)  Crohn's disease  AVN (avascular necrosis of bone)  History of right oophorectomy: 1985  S/P shoulder replacement, left: 4/11/2019  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, left-8/29/2018  [x ] Reviewed     SOCIAL HISTORY:  Former Tobacco use as a teenager for 1-2 years  Rare etoh    FAMILY HISTORY:  Sister with Crohn's disease    REVIEW OF SYSTEMS:  [x] All other ROS negative  [  ] Unable to obtain due to poor mental status    Vital Signs Last 24 Hrs  T(C): 36.8 (23 Oct 2019 13:32), Max: 36.8 (23 Oct 2019 01:14)  T(F): 98.3 (23 Oct 2019 13:32), Max: 98.3 (23 Oct 2019 13:32)  HR: 64 (23 Oct 2019 13:32) (55 - 75)  BP: 104/51 (23 Oct 2019 13:32) (101/71 - 129/70)  BP(mean): 76 (22 Oct 2019 21:00) (61 - 98)  RR: 18 (23 Oct 2019 13:32) (10 - 19)  SpO2: 98% (23 Oct 2019 13:32) (93% - 98%)    PHYSICAL EXAM:  GENERAL: NAD, well-groomed, well-developed  HEAD:  Atraumatic, Normocephalic  EYES: EOMI, PERRLA, conjunctiva and sclera clear  ENMT: Moist mucous membranes  NECK: Supple, No JVD  RESPIRATORY: Clear to auscultation bilaterally; No rales, rhonchi, wheezing, or rubs  CARDIOVASCULAR: Regular rate and rhythm; No murmurs, rubs, or gallops  GASTROINTESTINAL: Soft, Nontender, Nondistended; Bowel sounds present  EXTREMITIES:  2+ Peripheral Pulses, No clubbing, cyanosis, or edema  NERVOUS SYSTEM:  Alert & Oriented X3; Moving all 4 extremities; No gross sensory deficits  HEME/LYMPH: No lymphadenopathy noted  SKIN: No rashes or lesions; Incisions C/D/I    LABS:                        11.0   7.01  )-----------( 192      ( 23 Oct 2019 05:30 )             35.9     Hemoglobin: 11.0 g/dL (10-23 @ 05:30)  Hemoglobin: 11.3 g/dL (10-22 @ 15:14)    10-23    141  |  105  |  12  ----------------------------<  107<H>  4.2   |  25  |  0.84    Ca    8.7      23 Oct 2019 05:30            [x] Care Discussed with Consultants/Other Providers: Ortho PA - discussed

## 2019-10-23 NOTE — PHYSICAL THERAPY INITIAL EVALUATION ADULT - ADDITIONAL COMMENTS
Pt. reports owning DME of straight cane, rolling walker.     Pt. was left supine in bed post PT Evaluation, no apparent distress,  present. RN made aware of pt. status and participation in PT.

## 2019-10-23 NOTE — DISCHARGE NOTE PROVIDER - NSDCFUADDINST_GEN_ALL_CORE_FT
The patient is a 63 y/o F PMH significant for Crohn's Disease, AVN, GERD, lumbar stenosis/HNP with radiculopathy status post uncomplicated  L3-L4 Discectomy 10/22/2019.   The patient tolerated the procedure well and was transferred to the floor in stable condition.  Remainder of hospital course was uneventful.  Surgical pain is controlled with current PO regimen.  Patient reports complete resolution of lower extremity pain and paresthesia.      Functional goals have been met with Physical Therapy.     Patient is neurologically intact; motor strength and sensation.  Medically cleared for discharge to home at this time.    Incision site is well approximated, clean/dry/intact.  No erythema/swelling/drainage.     Keep incision site clean/dry/intact at all times.  Change dressing as needed.  Materials provided.     Restrictions for at least 8 weeks; no strenuous activity.  No lift/push/pull/carry objects greater than 10lbs. No repetitive bending/lifting/twisting/squatting.  No prolonged sitting/standing/walking greater than 30 minutes.  Advance activity as tolerated.    LSO brace with ambulation and during transport.    Follow-up with Dr. Pedro in 7-10 days for routine post-operative visit.  Call [phone] to schedule appointment.

## 2019-10-28 LAB — SURGICAL PATHOLOGY STUDY: SIGNIFICANT CHANGE UP

## 2019-11-04 ENCOUNTER — APPOINTMENT (OUTPATIENT)
Dept: ORTHOPEDIC SURGERY | Facility: CLINIC | Age: 64
End: 2019-11-04
Payer: MEDICARE

## 2019-11-04 DIAGNOSIS — M48.07 SPINAL STENOSIS, LUMBOSACRAL REGION: ICD-10-CM

## 2019-11-04 PROCEDURE — 72100 X-RAY EXAM L-S SPINE 2/3 VWS: CPT

## 2019-11-04 PROCEDURE — 99024 POSTOP FOLLOW-UP VISIT: CPT

## 2019-11-04 NOTE — PHYSICAL EXAM
[Normal] : Gait: normal [SLR] : negative straight leg raise [de-identified] : 5 out of 5 motor strength, sensation is intact and symmetrical full range of motion flexion extension and rotation, no palpatory tenderness full range of motion of hips knees shoulders and elbows, no atrophy, negative straight leg raise, no pathological reflexes, no swelling, normal ambulation, no apparent distress, skin is intact, no palpable lymph nodes, no upper or lower extremity instability, alert and oriented x3 and normal mood. normal finger to nose test.\par -SLR [de-identified] : AP/lat lumbar-laminectomy-

## 2019-11-04 NOTE — DISCUSSION/SUMMARY
[de-identified] : 3 days s/p L3 laminectomy, partial facetectomy, and decompression of the L3 and  L4 on 10/22/2019\par She feels good\par All options were discussed including rest, medicine, chiropractor, acupuncture, , PT , pain management, and last resort surgery. \par F/U 1 month.\par NSAIDs PRN.\par All questions were answered, all alternatives were discussed and the patient is in complete agreement with that plan. Follow-up appointment as instructed. Any issues and the patient will call or come in sooner.\par

## 2019-11-04 NOTE — ADDENDUM
[FreeTextEntry1] : I, Moises Ruiz, acted solely as a scribe for Dr. Pedro on 11/04/2019  .\par  \par All medical record entries made by the scribe were at my, Dr. Pedro, direction and personally dictated by me on 11/04/2019. I have reviewed the chart and agree that the record accurately reflects my personal performance of the history, physical exam, assessment and plan. I have also personally directed, reviewed, and agreed with the chart.\par

## 2019-11-04 NOTE — HISTORY OF PRESENT ILLNESS
[Improving] : improving [de-identified] : 64 year old female 13 days s/p L34 laminectomy, partial facetectomy, and decompression of the L3 and  L4 on 10/22/2019\par She feels good\par 2 weeks getting better.\par no fever, chills, sweats, nausea vomiting no bowel movement or bladder dysfunction, no recent weight loss or gain no night pain. the history is in addition to the intake form i personally reviewed.\par Here with .\par

## 2019-11-05 ENCOUNTER — OTHER (OUTPATIENT)
Age: 64
End: 2019-11-05

## 2019-12-04 ENCOUNTER — APPOINTMENT (OUTPATIENT)
Dept: ORTHOPEDIC SURGERY | Facility: CLINIC | Age: 64
End: 2019-12-04
Payer: MEDICARE

## 2019-12-04 DIAGNOSIS — M51.26 OTHER INTERVERTEBRAL DISC DISPLACEMENT, LUMBAR REGION: ICD-10-CM

## 2019-12-04 PROCEDURE — 99024 POSTOP FOLLOW-UP VISIT: CPT

## 2019-12-04 PROCEDURE — 20552 NJX 1/MLT TRIGGER POINT 1/2: CPT | Mod: 58

## 2019-12-04 NOTE — DISCUSSION/SUMMARY
[de-identified] : 6 weeks s/p lumbar laminectomy, partial facetectomy. \par Right sacroiliitis. \par She feels good\par All options were discussed including rest, medicine, chiropractor, acupuncture, , PT , pain management, and last resort surgery. \par I offered an injection after all risks were explained including allergic reaction to an infection under sterile conditions 1 mg of Depo-Medrol and 2 cc of 1% lidocaine without epinephrine was injected into the painful site. The patient tolerated the procedure well and received significant relief following the injection.\par Injection into the right SI joint. \par NSAIDs PRN.\par F/U 2 months. \par All questions were answered, all alternatives were discussed and the patient is in complete agreement with that plan. Follow-up appointment as instructed. Any issues and the patient will call or come in sooner.\par

## 2019-12-04 NOTE — PHYSICAL EXAM
[UE/LE] : Sensory: Intact in bilateral upper & lower extremities [ALL] : dorsalis pedis, posterior tibial, femoral, popliteal, and radial 2+ and symmetric bilaterally [Normal] : Oriented to person, place, and time, insight and judgement were intact and the affect was normal [SLR] : negative straight leg raise [Poor Appearance] : well-appearing [Acute Distress] : not in acute distress [de-identified] : 5 out of 5 motor strength, sensation is intact and symmetrical full range of motion flexion extension and rotation, no palpatory tenderness full range of motion of hips knees shoulders and elbows, no atrophy, negative straight leg raise, no pathological reflexes, no swelling, normal ambulation, no apparent distress, skin is intact, no palpable lymph nodes, no upper or lower extremity instability, alert and oriented x3 and normal mood. normal finger to nose test.\par Incision is healing well with no signs of infection. \par Tenderness over the right SI joint. \par -SLR

## 2019-12-04 NOTE — ADDENDUM
[FreeTextEntry1] : This note was authored by Viridiana Massey working as a medical scribe for Dr. Sriram Pedro. The note was reviewed, edited, and revised by Dr. Sriram Pedro whom is in agreement with the exam findings, imaging findings, and treatment plan. Dec 04, 2019

## 2019-12-04 NOTE — HISTORY OF PRESENT ILLNESS
[Improving] : improving [de-identified] : 64 year old female 6 weeks s/p L3-4 laminectomy, partial facetectomy, and decompression of the L3 and  L4 on 10/22/2019.\par She feels good.\par No PT.\par Resolution of left leg pain.  Numbness is improving.\par C/O intermittent ache in the right side of her lower back.\par No fever chills sweats nausea vomiting no bowel or bladder dysfunction, no recent weight loss or gain no night pain. This history is in addition to the intake form that I personally reviewed.

## 2019-12-16 ENCOUNTER — APPOINTMENT (OUTPATIENT)
Dept: ORTHOPEDIC SURGERY | Facility: CLINIC | Age: 64
End: 2019-12-16
Payer: MEDICARE

## 2019-12-16 PROCEDURE — 99024 POSTOP FOLLOW-UP VISIT: CPT

## 2019-12-16 NOTE — REASON FOR VISIT
[Post Operative Visit] : a post operative visit for [FreeTextEntry2] : s/p lumbar laminectomy, partial facetectomy done on 10/22/2019

## 2019-12-16 NOTE — HISTORY OF PRESENT ILLNESS
[Improving] : improving [de-identified] : 64 year old female 8 weeks  s/p L3-4 laminectomy, partial facetectomy, and decompression of the L3 and  L4 on 10/22/19\par She is feeling better\par Has right SI joint pain that radiates to the left SI joint. \par Has SI joint injection on right side-- helped a little bit on 12/09/2019.\par Resolution of left leg pain. Denies any Numbness.\par No fever chills sweats nausea vomiting no bowel or bladder dysfunction, no recent weight loss or gain no night pain. This history is in addition to the intake form that I personally reviewed. \par She states the symptoms are improving.

## 2019-12-16 NOTE — PHYSICAL EXAM
[UE/LE] : Sensory: Intact in bilateral upper & lower extremities [ALL] : dorsalis pedis, posterior tibial, femoral, popliteal, and radial 2+ and symmetric bilaterally [Normal] : Oriented to person, place, and time, insight and judgement were intact and the affect was normal [SLR] : negative straight leg raise [Acute Distress] : not in acute distress [Poor Appearance] : well-appearing [de-identified] : 5 out of 5 motor strength, sensation is intact and symmetrical full range of motion flexion extension and rotation, no palpatory tenderness full range of motion of hips knees shoulders and elbows, no atrophy, negative straight leg raise, no pathological reflexes, no swelling, normal ambulation, no apparent distress, skin is intact, no palpable lymph nodes, no upper or lower extremity instability, alert and oriented x3 and normal mood. normal finger to nose test.\par Incision is healing well with no signs of infection. \par Tenderness over the right SI joint. \par -SLR

## 2019-12-16 NOTE — ADDENDUM
[FreeTextEntry1] : I, Wilner Maldonado , acted solely as a scribe for Dr. Sriram Pedro on this date 12/16/2019.\par All medical record entries made by the Scribe were at my, Dr. Sriram Pedro, direction and personally dictated by me on 12/16/2019. I have reviewed the chart and agree that the record accurately reflects my personal performance of the history, physical exam, assessment and plan. I have also personally directed, reviewed, and agreed with the chart.

## 2019-12-16 NOTE — DISCUSSION/SUMMARY
[de-identified] : 8 weeks  s/p lumbar laminectomy, partial facetectomy done on 10/22/2019\par right sacroiliitis. \par She feels good- denies any left leg pain.\par Right SI Injection: I offered an injection after all risks were explained including allergic reaction to an infection under sterile conditions 1 mg of Depo-Medrol and 2 cc of 1% lidocaine without epinephrine was injected into the painful site. The patient tolerated the procedure well and received significant relief following the injection.\par NSAIDs PRN.\par All questions were answered, all alternatives were discussed and the patient is in complete agreement with that plan. Follow-up appointment as instructed. Any issues and the patient will call or come in sooner.\par FU in 2 month. \par

## 2020-01-20 ENCOUNTER — FORM ENCOUNTER (OUTPATIENT)
Age: 65
End: 2020-01-20

## 2020-02-05 ENCOUNTER — APPOINTMENT (OUTPATIENT)
Dept: ORTHOPEDIC SURGERY | Facility: CLINIC | Age: 65
End: 2020-02-05
Payer: MEDICARE

## 2020-02-05 DIAGNOSIS — M60.9 MYOSITIS, UNSPECIFIED: ICD-10-CM

## 2020-02-05 DIAGNOSIS — M54.5 LOW BACK PAIN: ICD-10-CM

## 2020-02-05 DIAGNOSIS — M79.10 MYALGIA, UNSPECIFIED SITE: ICD-10-CM

## 2020-02-05 PROCEDURE — 20552 NJX 1/MLT TRIGGER POINT 1/2: CPT

## 2020-02-05 PROCEDURE — 72100 X-RAY EXAM L-S SPINE 2/3 VWS: CPT

## 2020-02-05 PROCEDURE — 99214 OFFICE O/P EST MOD 30 MIN: CPT | Mod: 25

## 2020-02-05 NOTE — PHYSICAL EXAM
[UE/LE] : Sensory: Intact in bilateral upper & lower extremities [ALL] : dorsalis pedis, posterior tibial, femoral, popliteal, and radial 2+ and symmetric bilaterally [Normal] : Oriented to person, place, and time, insight and judgement were intact and the affect was normal [Poor Appearance] : well-appearing [SLR] : negative straight leg raise [Acute Distress] : not in acute distress [de-identified] : 5 out of 5 motor strength, sensation is intact and symmetrical full range of motion flexion extension and rotation, no palpatory tenderness full range of motion of hips knees shoulders and elbows, no atrophy, negative straight leg raise, no pathological reflexes, no swelling, normal ambulation, no apparent distress, skin is intact, no palpable lymph nodes, no upper or lower extremity instability, alert and oriented x3 and normal mood. normal finger to nose test.\par Lumbar: Incision is healing well with no signs of infection. Negative SLR on left. Patellar reflexes intact.  [de-identified] : AP/lat lumbar 02/05/2020: Adequate lumbar decompression - reviewed with the patient and her .

## 2020-02-05 NOTE — ADDENDUM
[FreeTextEntry1] : This note was authored by Viridiana Massey working as a medical scribe for Dr. Sriram Pedro. The note was reviewed, edited, and revised by Dr. Sriram Pedro whom is in agreement with the exam findings, imaging findings, and treatment plan. Feb 05, 2020

## 2020-02-05 NOTE — HISTORY OF PRESENT ILLNESS
[Stable] : stable [de-identified] : 64 year old female 3.5 months  s/p L3-4 laminectomy, partial facetectomy, and decompression of the L3 and  L4 on 10/22/19\par On 1/30 went on vacation was carrying a beach bag - had pain from L lower back that radiates down to L hip and groin. \par Admits to walking around a lot during her vacation. \par Is concerned that she may have re-herniated a disc. \par No fever chills sweats nausea vomiting no bowel or bladder dysfunction, no recent weight loss or gain no night pain. This history is in addition to the intake form that I personally reviewed.

## 2020-02-05 NOTE — DISCUSSION/SUMMARY
[de-identified] : 3.5 months  s/p lumbar laminectomy, partial facetectomy done on 10/22/2019\par Having left-sided low back since going on vacation 1/30/2020. \par Left SI Injection: I offered an injection after all risks were explained including allergic reaction to an infection under sterile conditions 1 mg of Depo-Medrol and 2 cc of 1% lidocaine without epinephrine was injected into the painful site. The patient tolerated the procedure well and received significant relief following the injection.\par NSAIDs PRN.\par Voltaren\par F/U 3 months. \par All questions were answered, all alternatives were discussed and the patient is in complete agreement with that plan. Follow-up appointment as instructed. Any issues and the patient will call or come in sooner.\par

## 2020-02-10 ENCOUNTER — APPOINTMENT (OUTPATIENT)
Dept: ORTHOPEDIC SURGERY | Facility: CLINIC | Age: 65
End: 2020-02-10
Payer: MEDICARE

## 2020-02-10 PROCEDURE — 99214 OFFICE O/P EST MOD 30 MIN: CPT

## 2020-02-10 RX ORDER — OXYCODONE 10 MG/1
10 TABLET ORAL EVERY 6 HOURS
Qty: 20 | Refills: 0 | Status: DISCONTINUED | OUTPATIENT
Start: 2020-02-10 | End: 2020-02-10

## 2020-02-25 ENCOUNTER — OUTPATIENT (OUTPATIENT)
Dept: OUTPATIENT SERVICES | Facility: HOSPITAL | Age: 65
LOS: 1 days | End: 2020-02-25
Payer: MEDICARE

## 2020-02-25 ENCOUNTER — APPOINTMENT (OUTPATIENT)
Dept: MRI IMAGING | Facility: HOSPITAL | Age: 65
End: 2020-02-25

## 2020-02-25 DIAGNOSIS — Z90.721 ACQUIRED ABSENCE OF OVARIES, UNILATERAL: Chronic | ICD-10-CM

## 2020-02-25 DIAGNOSIS — Z96.649 PRESENCE OF UNSPECIFIED ARTIFICIAL HIP JOINT: Chronic | ICD-10-CM

## 2020-02-25 DIAGNOSIS — Z96.612 PRESENCE OF LEFT ARTIFICIAL SHOULDER JOINT: Chronic | ICD-10-CM

## 2020-02-25 DIAGNOSIS — Z00.8 ENCOUNTER FOR OTHER GENERAL EXAMINATION: ICD-10-CM

## 2020-02-25 PROCEDURE — 72148 MRI LUMBAR SPINE W/O DYE: CPT

## 2020-02-25 PROCEDURE — 72148 MRI LUMBAR SPINE W/O DYE: CPT | Mod: 26

## 2020-02-27 ENCOUNTER — OUTPATIENT (OUTPATIENT)
Dept: OUTPATIENT SERVICES | Facility: HOSPITAL | Age: 65
LOS: 1 days | End: 2020-02-27
Payer: MEDICARE

## 2020-02-27 ENCOUNTER — APPOINTMENT (OUTPATIENT)
Dept: MRI IMAGING | Facility: HOSPITAL | Age: 65
End: 2020-02-27
Payer: MEDICARE

## 2020-02-27 DIAGNOSIS — Z90.721 ACQUIRED ABSENCE OF OVARIES, UNILATERAL: Chronic | ICD-10-CM

## 2020-02-27 DIAGNOSIS — M48.07 SPINAL STENOSIS, LUMBOSACRAL REGION: ICD-10-CM

## 2020-02-27 DIAGNOSIS — Z96.612 PRESENCE OF LEFT ARTIFICIAL SHOULDER JOINT: Chronic | ICD-10-CM

## 2020-02-27 DIAGNOSIS — M54.16 RADICULOPATHY, LUMBAR REGION: ICD-10-CM

## 2020-02-27 DIAGNOSIS — M51.26 OTHER INTERVERTEBRAL DISC DISPLACEMENT, LUMBAR REGION: ICD-10-CM

## 2020-02-27 DIAGNOSIS — Z96.649 PRESENCE OF UNSPECIFIED ARTIFICIAL HIP JOINT: Chronic | ICD-10-CM

## 2020-02-27 PROCEDURE — 72149 MRI LUMBAR SPINE W/DYE: CPT | Mod: 26

## 2020-02-27 PROCEDURE — 72149 MRI LUMBAR SPINE W/DYE: CPT

## 2020-02-27 PROCEDURE — A9579: CPT

## 2020-03-02 ENCOUNTER — APPOINTMENT (OUTPATIENT)
Dept: ORTHOPEDIC SURGERY | Facility: CLINIC | Age: 65
End: 2020-03-02
Payer: MEDICARE

## 2020-03-02 DIAGNOSIS — M54.16 RADICULOPATHY, LUMBAR REGION: ICD-10-CM

## 2020-03-02 PROCEDURE — 99214 OFFICE O/P EST MOD 30 MIN: CPT

## 2020-03-26 ENCOUNTER — RX RENEWAL (OUTPATIENT)
Age: 65
End: 2020-03-26

## 2020-08-05 DIAGNOSIS — Z01.818 ENCOUNTER FOR OTHER PREPROCEDURAL EXAMINATION: ICD-10-CM

## 2020-08-07 ENCOUNTER — APPOINTMENT (OUTPATIENT)
Dept: DISASTER EMERGENCY | Facility: CLINIC | Age: 65
End: 2020-08-07

## 2020-08-07 LAB — SARS-COV-2 N GENE NPH QL NAA+PROBE: NOT DETECTED

## 2021-05-04 ENCOUNTER — OUTPATIENT (OUTPATIENT)
Dept: OUTPATIENT SERVICES | Facility: HOSPITAL | Age: 66
LOS: 1 days | End: 2021-05-04
Payer: MEDICARE

## 2021-05-04 VITALS
SYSTOLIC BLOOD PRESSURE: 122 MMHG | RESPIRATION RATE: 14 BRPM | TEMPERATURE: 98 F | DIASTOLIC BLOOD PRESSURE: 68 MMHG | HEIGHT: 63 IN | HEART RATE: 63 BPM | WEIGHT: 143.08 LBS | OXYGEN SATURATION: 97 %

## 2021-05-04 DIAGNOSIS — Z01.818 ENCOUNTER FOR OTHER PREPROCEDURAL EXAMINATION: ICD-10-CM

## 2021-05-04 DIAGNOSIS — Z96.649 PRESENCE OF UNSPECIFIED ARTIFICIAL HIP JOINT: Chronic | ICD-10-CM

## 2021-05-04 DIAGNOSIS — Z98.890 OTHER SPECIFIED POSTPROCEDURAL STATES: Chronic | ICD-10-CM

## 2021-05-04 DIAGNOSIS — G57.61 LESION OF PLANTAR NERVE, RIGHT LOWER LIMB: ICD-10-CM

## 2021-05-04 DIAGNOSIS — Z90.721 ACQUIRED ABSENCE OF OVARIES, UNILATERAL: Chronic | ICD-10-CM

## 2021-05-04 DIAGNOSIS — Z96.612 PRESENCE OF LEFT ARTIFICIAL SHOULDER JOINT: Chronic | ICD-10-CM

## 2021-05-04 LAB
A1C WITH ESTIMATED AVERAGE GLUCOSE RESULT: 6.1 % — HIGH (ref 4–5.6)
ALBUMIN SERPL ELPH-MCNC: 3.9 G/DL — SIGNIFICANT CHANGE UP (ref 3.3–5)
ALP SERPL-CCNC: 53 U/L — SIGNIFICANT CHANGE UP (ref 40–120)
ALT FLD-CCNC: 38 U/L — SIGNIFICANT CHANGE UP (ref 12–78)
ANION GAP SERPL CALC-SCNC: 7 MMOL/L — SIGNIFICANT CHANGE UP (ref 5–17)
AST SERPL-CCNC: 16 U/L — SIGNIFICANT CHANGE UP (ref 15–37)
BILIRUB SERPL-MCNC: 0.3 MG/DL — SIGNIFICANT CHANGE UP (ref 0.2–1.2)
BUN SERPL-MCNC: 19 MG/DL — SIGNIFICANT CHANGE UP (ref 7–23)
CALCIUM SERPL-MCNC: 9 MG/DL — SIGNIFICANT CHANGE UP (ref 8.5–10.1)
CHLORIDE SERPL-SCNC: 110 MMOL/L — HIGH (ref 96–108)
CO2 SERPL-SCNC: 27 MMOL/L — SIGNIFICANT CHANGE UP (ref 22–31)
CREAT SERPL-MCNC: 0.83 MG/DL — SIGNIFICANT CHANGE UP (ref 0.5–1.3)
ESTIMATED AVERAGE GLUCOSE: 128 MG/DL — HIGH (ref 68–114)
GLUCOSE SERPL-MCNC: 102 MG/DL — HIGH (ref 70–99)
HCT VFR BLD CALC: 42.5 % — SIGNIFICANT CHANGE UP (ref 34.5–45)
HGB BLD-MCNC: 13.4 G/DL — SIGNIFICANT CHANGE UP (ref 11.5–15.5)
MCHC RBC-ENTMCNC: 27.4 PG — SIGNIFICANT CHANGE UP (ref 27–34)
MCHC RBC-ENTMCNC: 31.5 GM/DL — LOW (ref 32–36)
MCV RBC AUTO: 86.9 FL — SIGNIFICANT CHANGE UP (ref 80–100)
NRBC # BLD: 0 /100 WBCS — SIGNIFICANT CHANGE UP (ref 0–0)
PLATELET # BLD AUTO: 225 K/UL — SIGNIFICANT CHANGE UP (ref 150–400)
POTASSIUM SERPL-MCNC: 4.6 MMOL/L — SIGNIFICANT CHANGE UP (ref 3.5–5.3)
POTASSIUM SERPL-SCNC: 4.6 MMOL/L — SIGNIFICANT CHANGE UP (ref 3.5–5.3)
PROT SERPL-MCNC: 7.9 G/DL — SIGNIFICANT CHANGE UP (ref 6–8.3)
RBC # BLD: 4.89 M/UL — SIGNIFICANT CHANGE UP (ref 3.8–5.2)
RBC # FLD: 13.6 % — SIGNIFICANT CHANGE UP (ref 10.3–14.5)
SODIUM SERPL-SCNC: 144 MMOL/L — SIGNIFICANT CHANGE UP (ref 135–145)
WBC # BLD: 4.9 K/UL — SIGNIFICANT CHANGE UP (ref 3.8–10.5)
WBC # FLD AUTO: 4.9 K/UL — SIGNIFICANT CHANGE UP (ref 3.8–10.5)

## 2021-05-04 PROCEDURE — 36415 COLL VENOUS BLD VENIPUNCTURE: CPT

## 2021-05-04 PROCEDURE — 93005 ELECTROCARDIOGRAM TRACING: CPT

## 2021-05-04 PROCEDURE — 85027 COMPLETE CBC AUTOMATED: CPT

## 2021-05-04 PROCEDURE — 83036 HEMOGLOBIN GLYCOSYLATED A1C: CPT

## 2021-05-04 PROCEDURE — G0463: CPT

## 2021-05-04 PROCEDURE — 93010 ELECTROCARDIOGRAM REPORT: CPT

## 2021-05-04 PROCEDURE — 80053 COMPREHEN METABOLIC PANEL: CPT

## 2021-05-04 RX ORDER — ADALIMUMAB 40MG/0.8ML
0 KIT SUBCUTANEOUS
Qty: 0 | Refills: 0 | DISCHARGE

## 2021-05-04 NOTE — H&P PST ADULT - ATTENDING COMMENTS
ADDENDUM 5/14/2021: Pt seen in holding room prior to procedure. Pt awake, alert and oriented X 3. VS as charted. Pt denies any changes in her health since she was last seen in PST. COVID PCR swab negative. Medical clearance on chart. Assessment unchanged; lungs clear to auscultation bilaterally. Procedure and laterality verified with patient. Dr Ugalde has seen patient and marked surgical site. Pt optimized to proceed with scheduled procedure; Decompression Nerve RIGHT Foot 3rd Interspace with Dr Benito Ugalde. Nely GORMAN.

## 2021-05-04 NOTE — H&P PST ADULT - NSICDXFAMILYHX_GEN_ALL_CORE_FT
FAMILY HISTORY:  Father  Still living? No  Family history of Hodgkin's disease, Age at diagnosis: Age Unknown

## 2021-05-04 NOTE — H&P PST ADULT - ASSESSMENT
66 year old female PMH Palpitations, Hypercholesterolemia, Crohns Disease, GERD, Anxiety, Prediabetes; now with Lesion of Plantar Nerve, RIGHT lower Limb; presents for PST prior to Decompression Nerve RIGHT Foot 3rd Interspace with Dr marie Ugalde on 5/14/2021.

## 2021-05-04 NOTE — H&P PST ADULT - HISTORY OF PRESENT ILLNESS
66 year old female PMH Palpitations, Hypercholesterolemia, Crohns Disease, GERD, Anxiety, Prediabetes; now with Lesion of Plantar Nerve, RIGHT lower Limb; presents for PST prior to Decompression Nerve RIGHT Foot 3rd Interspace with Dr marie Ugalde on 5/14/2021. Pt states she has a Neuroma on her RIGHT Foot for the last 2 years which has been causing her a lot of pain. Pt notes she has received "2 sessions of 3 times each of steroid injections with little relief noted." pt notes due to the pain she is only able to wear certain shoes. Rates pain "when walking in uncomfortable shoes pain #8/10 but if in a more comfortable shoe rates pain #3/10. " Denies use of any pain medications. Following discussions on treatment options with Dr Ugalde pt is electing for scheduled procedure.

## 2021-05-04 NOTE — H&P PST ADULT - GASTROINTESTINAL COMMENTS
Notes H/O Crohns Disease - notes any symptoms are baseline for her secondary to her Crohns - no recent changes

## 2021-05-04 NOTE — H&P PST ADULT - NSICDXPASTMEDICALHX_GEN_ALL_CORE_FT
PAST MEDICAL HISTORY:  Anxiety     AVN (avascular necrosis of bone) From Steroid Use when she was first DX with Crohns    Crohn's disease     GERD (gastroesophageal reflux disease)     History of palpitations     Hypercholesterolemia     Lesion of plantar nerve, right lower limb

## 2021-05-04 NOTE — H&P PST ADULT - NEGATIVE ENMT SYMPTOMS
no hearing difficulty/no sinus symptoms/no nasal congestion/no abnormal taste sensation/no throat pain/no dysphagia

## 2021-05-04 NOTE — H&P PST ADULT - NSICDXPROBLEM_GEN_ALL_CORE_FT
PROBLEM DIAGNOSES  Problem: Lesion of plantar nerve, right lower limb  Assessment and Plan: [facility] Labs; CBC, CMP, HgA A1C, EKG. Medical clearance scheduled with PCP on 5/6/2021.  Has  recently been seen by Cardiology (is followed for Hypercholesterolemia) - will have office send note for anesthesia review. Instructed pt to stop any NSAIDS/Herbal Supplements between now and procedure. She will stop her Humira and her Supplements one week prior to procedure. may take Tylenol if needed for pain between now and procedure. Morning of procedure she may take her Lexapro with small sip of water. Pre-op instructions as well as pre-op wash instructions given to pt with understanding verbalized. Discussed with patient she would need to have COVID swab done at [facility] drive thru 72-48 hours prior to procedure. Informed pt [place] admitting department would call her to schedule appointment. Proviedd pt with phone number to [place] should she have any questions. Understanding of all instructions verbalized. All questions addressed with patient prior to her leaving the PST department.

## 2021-05-04 NOTE — H&P PST ADULT - NS HEP C RISK YEAR OPTION
Department of Anesthesiology  Postprocedure Note    Patient: Дмитрий Escobar  MRN: [id number]  YOB: 1955  Date of evaluation: 12/26/2018  Time:  4:45 PM     Procedure Summary     Date:  12/26/18 Room / Location:  [facility] OR 04 / [facility] OR    Anesthesia Start:  1226 Anesthesia Stop:  0619    Procedure:  LEFT FIBULAR OSTECTOMY, ANKLE FUSION WITH MINI C-ARM AND BLOCK FOR PAIN CONTROL                 AUGMENT (Left Ankle) Diagnosis:       Arthritis of left ankle      (-)    Surgeon:  Renetta Orozco MD Responsible Provider:  Sharifa Weston MD    Anesthesia Type:  general ASA Status:  2          Anesthesia Type: general    Angela Phase I: Angela Score: 9    Angela Phase II: Angela Score: 10    Last vitals: Reviewed and per EMR flowsheets.        Anesthesia Post Evaluation    Patient location during evaluation: PACU  Patient participation: complete - patient participated  Level of consciousness: awake and alert  Pain score: 0  Airway patency: patent  Nausea & Vomiting: no nausea and no vomiting  Complications: no  Cardiovascular status: blood pressure returned to baseline  Respiratory status: acceptable  Hydration status: stable Patient Refused

## 2021-05-04 NOTE — H&P PST ADULT - GENERAL COMMENTS
Denies any travel in the last 14 days - denies any recent known exposure to anyone with known or suspected COVID - denies any current COVID symptoms

## 2021-05-04 NOTE — H&P PST ADULT - NSICDXPASTSURGICALHX_GEN_ALL_CORE_FT
PAST SURGICAL HISTORY:  H/O colonoscopy Yearly    H/O endoscopy 2020    H/O laminectomy 10/22/2019    History of right oophorectomy 1985    History of total hip replacement right-9/23/2010, left-8/29/2018    S/P shoulder replacement, left 4/11/2019

## 2021-05-10 PROBLEM — Z87.898 PERSONAL HISTORY OF OTHER SPECIFIED CONDITIONS: Chronic | Status: ACTIVE | Noted: 2021-05-04

## 2021-05-10 PROBLEM — E78.00 PURE HYPERCHOLESTEROLEMIA, UNSPECIFIED: Chronic | Status: ACTIVE | Noted: 2021-05-04

## 2021-05-10 PROBLEM — M87.00 IDIOPATHIC ASEPTIC NECROSIS OF UNSPECIFIED BONE: Chronic | Status: ACTIVE | Noted: 2019-10-08

## 2021-05-10 PROBLEM — G57.61 LESION OF PLANTAR NERVE, RIGHT LOWER LIMB: Chronic | Status: ACTIVE | Noted: 2021-05-04

## 2021-05-11 ENCOUNTER — OUTPATIENT (OUTPATIENT)
Dept: OUTPATIENT SERVICES | Facility: HOSPITAL | Age: 66
LOS: 1 days | End: 2021-05-11
Payer: MEDICARE

## 2021-05-11 DIAGNOSIS — Z96.612 PRESENCE OF LEFT ARTIFICIAL SHOULDER JOINT: Chronic | ICD-10-CM

## 2021-05-11 DIAGNOSIS — Z98.890 OTHER SPECIFIED POSTPROCEDURAL STATES: Chronic | ICD-10-CM

## 2021-05-11 DIAGNOSIS — Z90.721 ACQUIRED ABSENCE OF OVARIES, UNILATERAL: Chronic | ICD-10-CM

## 2021-05-11 DIAGNOSIS — Z20.828 CONTACT WITH AND (SUSPECTED) EXPOSURE TO OTHER VIRAL COMMUNICABLE DISEASES: ICD-10-CM

## 2021-05-11 DIAGNOSIS — Z96.649 PRESENCE OF UNSPECIFIED ARTIFICIAL HIP JOINT: Chronic | ICD-10-CM

## 2021-05-11 LAB — SARS-COV-2 RNA SPEC QL NAA+PROBE: SIGNIFICANT CHANGE UP

## 2021-05-11 PROCEDURE — U0003: CPT

## 2021-05-11 PROCEDURE — U0005: CPT

## 2021-05-13 ENCOUNTER — TRANSCRIPTION ENCOUNTER (OUTPATIENT)
Age: 66
End: 2021-05-13

## 2021-05-13 NOTE — ASU PATIENT PROFILE, ADULT - VISION (WITH CORRECTIVE LENSES IF THE PATIENT USUALLY WEARS THEM):
wears eyeglasses for reading and driving/Partially impaired: cannot see medication labels or newsprint, but can see obstacles in path, and the surrounding layout; can count fingers at arm's length

## 2021-05-14 ENCOUNTER — OUTPATIENT (OUTPATIENT)
Dept: OUTPATIENT SERVICES | Facility: HOSPITAL | Age: 66
LOS: 1 days | End: 2021-05-14
Payer: MEDICARE

## 2021-05-14 ENCOUNTER — RESULT REVIEW (OUTPATIENT)
Age: 66
End: 2021-05-14

## 2021-05-14 VITALS
DIASTOLIC BLOOD PRESSURE: 72 MMHG | RESPIRATION RATE: 14 BRPM | OXYGEN SATURATION: 99 % | SYSTOLIC BLOOD PRESSURE: 121 MMHG | HEART RATE: 59 BPM

## 2021-05-14 VITALS
WEIGHT: 143.08 LBS | DIASTOLIC BLOOD PRESSURE: 67 MMHG | RESPIRATION RATE: 16 BRPM | TEMPERATURE: 98 F | HEIGHT: 63 IN | SYSTOLIC BLOOD PRESSURE: 121 MMHG | HEART RATE: 60 BPM | OXYGEN SATURATION: 96 %

## 2021-05-14 DIAGNOSIS — Z98.890 OTHER SPECIFIED POSTPROCEDURAL STATES: Chronic | ICD-10-CM

## 2021-05-14 DIAGNOSIS — G57.61 LESION OF PLANTAR NERVE, RIGHT LOWER LIMB: ICD-10-CM

## 2021-05-14 DIAGNOSIS — Z96.612 PRESENCE OF LEFT ARTIFICIAL SHOULDER JOINT: Chronic | ICD-10-CM

## 2021-05-14 DIAGNOSIS — Z90.721 ACQUIRED ABSENCE OF OVARIES, UNILATERAL: Chronic | ICD-10-CM

## 2021-05-14 DIAGNOSIS — Z96.649 PRESENCE OF UNSPECIFIED ARTIFICIAL HIP JOINT: Chronic | ICD-10-CM

## 2021-05-14 PROCEDURE — 88304 TISSUE EXAM BY PATHOLOGIST: CPT | Mod: 26

## 2021-05-14 PROCEDURE — 28080 REMOVAL OF FOOT LESION: CPT | Mod: RT

## 2021-05-14 PROCEDURE — 88304 TISSUE EXAM BY PATHOLOGIST: CPT

## 2021-05-14 RX ORDER — ASPIRIN/CALCIUM CARB/MAGNESIUM 324 MG
1 TABLET ORAL
Qty: 0 | Refills: 0 | DISCHARGE

## 2021-05-14 RX ORDER — SODIUM CHLORIDE 9 MG/ML
1000 INJECTION, SOLUTION INTRAVENOUS
Refills: 0 | Status: DISCONTINUED | OUTPATIENT
Start: 2021-05-14 | End: 2021-05-14

## 2021-05-14 RX ORDER — ADALIMUMAB 40MG/0.8ML
40 KIT SUBCUTANEOUS
Qty: 0 | Refills: 0 | DISCHARGE

## 2021-05-14 RX ORDER — OXYCODONE HYDROCHLORIDE 5 MG/1
5 TABLET ORAL ONCE
Refills: 0 | Status: DISCONTINUED | OUTPATIENT
Start: 2021-05-14 | End: 2021-05-14

## 2021-05-14 RX ORDER — EVOLOCUMAB 140 MG/ML
0 INJECTION, SOLUTION SUBCUTANEOUS
Qty: 0 | Refills: 0 | DISCHARGE

## 2021-05-14 RX ORDER — PANTOPRAZOLE SODIUM 20 MG/1
1 TABLET, DELAYED RELEASE ORAL
Qty: 0 | Refills: 0 | DISCHARGE

## 2021-05-14 RX ORDER — METOPROLOL TARTRATE 50 MG
1 TABLET ORAL
Qty: 0 | Refills: 0 | DISCHARGE

## 2021-05-14 RX ORDER — ASCORBIC ACID 60 MG
1 TABLET,CHEWABLE ORAL
Qty: 0 | Refills: 0 | DISCHARGE

## 2021-05-14 RX ORDER — CEFAZOLIN SODIUM 1 G
1000 VIAL (EA) INJECTION ONCE
Refills: 0 | Status: COMPLETED | OUTPATIENT
Start: 2021-05-14 | End: 2021-05-14

## 2021-05-14 RX ORDER — HYDROMORPHONE HYDROCHLORIDE 2 MG/ML
0.5 INJECTION INTRAMUSCULAR; INTRAVENOUS; SUBCUTANEOUS
Refills: 0 | Status: DISCONTINUED | OUTPATIENT
Start: 2021-05-14 | End: 2021-05-14

## 2021-05-14 RX ORDER — ESCITALOPRAM OXALATE 10 MG/1
1 TABLET, FILM COATED ORAL
Qty: 0 | Refills: 0 | DISCHARGE

## 2021-05-14 RX ORDER — CHOLECALCIFEROL (VITAMIN D3) 125 MCG
1 CAPSULE ORAL
Qty: 0 | Refills: 0 | DISCHARGE

## 2021-05-14 RX ORDER — ALPRAZOLAM 0.25 MG
1 TABLET ORAL
Qty: 0 | Refills: 0 | DISCHARGE

## 2021-05-14 RX ORDER — METOCLOPRAMIDE HCL 10 MG
5 TABLET ORAL ONCE
Refills: 0 | Status: DISCONTINUED | OUTPATIENT
Start: 2021-05-14 | End: 2021-05-14

## 2021-05-14 RX ORDER — UBIDECARENONE 100 MG
1 CAPSULE ORAL
Qty: 0 | Refills: 0 | DISCHARGE

## 2021-05-14 RX ADMIN — SODIUM CHLORIDE 100 MILLILITER(S): 9 INJECTION, SOLUTION INTRAVENOUS at 09:00

## 2021-05-14 RX ADMIN — SODIUM CHLORIDE 75 MILLILITER(S): 9 INJECTION, SOLUTION INTRAVENOUS at 06:48

## 2021-05-14 NOTE — BRIEF OPERATIVE NOTE - OPERATION/FINDINGS
right foot deep transverse met ligament 3rd interspace incised with Claudia  sent right foot 3rd interspace soft tissue sample

## 2021-05-14 NOTE — ASU DISCHARGE PLAN (ADULT/PEDIATRIC) - ASU DC SPECIAL INSTRUCTIONSFT
Please keep dressing clean, dry, and intact.  When at home, please elevate your foot with pillows.   Pain medication has been sent to your pharmacy.   Please follow up with Dr. Ugalde in one week.   When walking, you must use a surgical shoe.  Use a cane if you are feeling off balance.

## 2021-05-29 NOTE — DISCHARGE NOTE NURSING/CASE MANAGEMENT/SOCIAL WORK - NSDCPEPT PROEDMA_GEN_ALL_CORE
OFFICE VISIT      Patient: Oscar Armstrong Date of Service: 2021   : 1960 MRN: [id number]     SUBJECTIVE:     Chief Complaint   Patient presents with   • Follow-up       HISTORY OF PRESENT ILLNESS:  Oscar Armstrong is a 60 year old male who presents today for follow up  \"I think I am doing ok and I am doing fine.\"    Forgot to bring in blood sugar log  Has sugar of 161 this am after eating a lot of bread last night  Had sugar of 71 yesterday morning after skipping meal night before  Otherwise no low blood sugars  Strongly declines change in his medications   Believes sugars 's with exception of above  Medications still affordable and indicates needs refills   Denies any  infections with use of his Jardiance     Exercises sporadically, no consistent routine   Denies any CP or TONY with activity   Taking ASA and atorvastatin daily; no SE of medications  Denies ENT, GI or  bleeding.   No joint effusions nor excessive bruising.       Indicates his BP is \"Perfect\"  Recalls BP of 123/?  Checks BP a few times per month   Taking lisinopril on regular basis and not missing doses   Denies SE of his medications     Constipation resolved  Not using Miralax as not required  Having regular daily BM   No melena or hematochezia     Has appt with his ophtho Dr Smiley next month  Indicates vision is normal and no sudden changes  Did see podiatrist for nail hypertrophy  Did not pursue treatment  Able to care for nails at this time and denies need for assistance in cutting nails       REVIEW OF SYSTEMS:  Review of Systems   Constitutional: Negative.    HENT: Negative for congestion, postnasal drip, rhinorrhea, sinus pressure and sinus pain.    Respiratory: Negative.    Cardiovascular: Negative.    Gastrointestinal: Negative.    Endocrine: Positive for polyphagia. Negative for cold intolerance and heat intolerance.   Genitourinary: Negative.    Musculoskeletal: Negative.    Skin: Negative.    Neurological: Negative.   Yes   Hematological: Negative.    Psychiatric/Behavioral: Negative.        MEDICATIONS:  Current Outpatient Medications   Medication Sig   • aspirin (ECOTRIN) 81 MG EC tablet Take 81 mg by mouth daily.   • albuterol 108 (90 Base) MCG/ACT inhaler USE 2 INHALATIONS ORALLY   EVERY 4 HOURS AS NEEDED FORWHEEZING   • atorvastatin (LIPITOR) 10 MG tablet Take one tablet by mouth once daily for cholesterol   • empagliflozin (JARDIANCE) 25 MG tablet Take one tablet by mouth once daily with breakfast for diabetes   • glyBURIDE (DIABETA) 5 MG tablet Take one tablet by mouth once daily with breakfast for diabetes   • lisinopril (ZESTRIL) 40 MG tablet Take one tablet by mouth once daily for high blood pressure   • metformin (GLUCOPHAGE) 1000 MG tablet TAKE 1 TABLET TWICE A DAY  WITH BREAKFAST AND DINNER FOR DIABETES   • montelukast (SINGULAIR) 10 MG tablet TAKE ONE TABLET BY MOUTH ONCE DAILY FOR ALLERGIES   • Multiple Vitamins-Minerals (Multivitamin Adults 50+) Tab Take 1 tablet by mouth daily.   • fexofenadine (ALLEGRA) 180 MG tablet Take 1 tablet by mouth daily. In the morning at 8 am     No current facility-administered medications for this visit.        ALLERGIES:  ALLERGIES:   Allergen Reactions   • Pneumococcal Vac Polyvalent Other (See Comments)     unknown       PAST MEDICAL HISTORY:  Past Medical History:   Diagnosis Date   • Choledocholithiasis    • Diabetes mellitus (CMS/HCC)    • Gallstone pancreatitis    • Herpesviral infection of urogenital system    • HSV-2 (herpes simplex virus 2) infection    • Hyperlipidemia    • Hypertension    • Hyponatremia    • Ileus, gallstone (CMS/HCC)    • Mild persistent allergic asthma    • Onychomycosis of toenail    • PND (post-nasal drip)    • Proteinuria    • Seasonal allergies        PAST SURGICAL HISTORY:  Past Surgical History:   Procedure Laterality Date   • Cholecystectomy     • Colonoscopy  04/28/2018    Dr Craft, repeat 10 years    • Ercp         FAMILY HISTORY:  Family History    Problem Relation Age of Onset   • Diabetes Mother    • Hypertension Mother    • Alcohol Abuse Father    • Diabetes Father    • Other Father         cerebral embolism       SOCIAL HISTORY:  Social History     Tobacco Use   • Smoking status: Never Smoker   • Smokeless tobacco: Never Used   Substance Use Topics   • Alcohol use: Not Currently   • Drug use: Never         OBJECTIVE:   PHYSICAL EXAM :  Vital Signs:    Visit Vitals  /74 (BP Location: LUE - Left upper extremity, Patient Position: Sitting, Cuff Size: Regular)   Pulse 84   Temp 97.7 °F (36.5 °C) (Oral)   Ht 5' 8\" (1.727 m)   Wt 68.6 kg (151 lb 2 oz)   SpO2 96%   BMI 22.98 kg/m²     Gen:  A&Ox3, NAD, well appearing  HEENT:  NC, AT, no conjunctival discharge and sclera normal.  TM pearly grey bilaterally.   Chest:  No w/r/r.  Normal respiratory effort and rate  CV:  RRR, S1, S2, No m/r/g.  No pedal edema.   No carotid bruits  Abd:  Soft, NT, ND, NABS.  No guarding or masses.   Ext:  No edema.  No cyanosis  Neuro:  Speech clear.  Mentation is normal.  Gait normal.  No tremors.   Skin:  Not diaphoretic.   Normal skin tone.  No rash.  No ulcers to feet and skin on surface of foot intact.  No calluses present.  Nails to bilateral great toes hypertrophic, irregular, and grey.    Psych:  Calm and pleasant affect.  Speech not pressured.  Sound decision making capacity.      Assessment AND PLAN:   This is a 60 year old year-old male who presents with:    Benign essential hypertension  - GLYCOHEMOGLOBIN; Future  - COMPREHENSIVE METABOLIC PANEL; Future  - MICROALBUMIN URINE RANDOM; Future  - LIPID PANEL WITH REFLEX; Future  - CBC WITH DIFFERENTIAL; Future  - COMPREHENSIVE METABOLIC PANEL  - GLYCOHEMOGLOBIN  - MICROALBUMIN URINE RANDOM  - LIPID PANEL WITH REFLEX  - CBC WITH DIFFERENTIAL    Dyslipidemia associated with type 2 diabetes mellitus (CMS/HCC)  - GLYCOHEMOGLOBIN; Future  - COMPREHENSIVE METABOLIC PANEL; Future  - MICROALBUMIN URINE RANDOM; Future  - LIPID  PANEL WITH REFLEX; Future  - CBC WITH DIFFERENTIAL; Future  - COMPREHENSIVE METABOLIC PANEL  - GLYCOHEMOGLOBIN  - MICROALBUMIN URINE RANDOM  - LIPID PANEL WITH REFLEX  - CBC WITH DIFFERENTIAL    Benefits of exercise reviewed and advised goal of 150 minutes of moderate intensity exercise per week, starting with 10-15 minutes 3 days of week of light walking or similar low impact activity.  Counseled on heart healthy, low glycemic diet  Risk of hypoglycemia reviewed and recommended to discontinue or lower dose of glyburide  Pt declines change in his medications and wishes to continue at this time  Advised to call if sugar < 80  Check sugars once daily fasting and PRN   Bring glucose log to each and every visit   Risk of hypoglycemia including seizures and death reviewed  Pt verbalizes understanding and continues to decline change in medications  Recommended AIC goal of 6-7% without hypoglycemia   HTN currently at goal of < 130/80  Check BP twice monthly, record, and bring numbers to each visit  Daily foot checks and diabetic foot care addressed  Annual eye exam advised    Advised on going universal masking and hand washing in light of COVID-19 pandemic.   Pt has received both vaccines and deemed fully vaccinated.     Time allowed for questions and expression of concerns.  Questions answered to pt's satisfaction.     RTC in 4 months and PRN       Instructions provided as documented in the AVS.      The patient indicated understanding of the diagnosis and agreed with the plan of care.

## 2021-09-15 NOTE — H&P PST ADULT - NS PRO TALK SOMEONE YN
Oculoplastic Surgeon Procedure Text (A): After obtaining clear surgical margins the patient was sent to oculoplastics for surgical repair.  The patient understands they will receive post-surgical care and follow-up from the referring physician's office. no

## 2022-01-17 NOTE — ASU PATIENT PROFILE, ADULT - TEACHING/LEARNING OTHER LEARNERS
Reached out to pt to schedule appt. Pt's daughter answered and we said we did not reach out in a timely manner stating that we did not care and that she will be finding a new provider for patient. spouse

## 2022-07-20 NOTE — PRE-OP CHECKLIST - ADDITIONAL CONSENTS
PATIENT INFORMATION    Anticipatory guidance discussed  Bicycle helmets  Importance of regular dental care  Importance of regular exercise  Importance of varied diet  Seat belts    Follow-Up  - Return in 1 year for your yearly well visit.    13-17 years old Health and Safety Tips - The following hyperlinks are available to access via [employer]    Parent Education from [employer]    Educación para padres sobre niños sanos    Additional Educational Resources:  For additional resources regarding your symptoms, diagnosis, or further health information, please visit the Discover a Healthier You section on [email or web address] or the Online Health Resources section in [employer].   rep

## 2022-07-28 ENCOUNTER — NON-APPOINTMENT (OUTPATIENT)
Age: 67
End: 2022-07-28

## 2022-07-29 ENCOUNTER — TRANSCRIPTION ENCOUNTER (OUTPATIENT)
Age: 67
End: 2022-07-29

## 2022-07-30 ENCOUNTER — OUTPATIENT (OUTPATIENT)
Dept: OUTPATIENT SERVICES | Facility: HOSPITAL | Age: 67
LOS: 1 days | End: 2022-07-30

## 2022-07-30 ENCOUNTER — APPOINTMENT (OUTPATIENT)
Dept: DISASTER EMERGENCY | Facility: HOSPITAL | Age: 67
End: 2022-07-30

## 2022-07-30 VITALS
SYSTOLIC BLOOD PRESSURE: 123 MMHG | HEART RATE: 82 BPM | DIASTOLIC BLOOD PRESSURE: 76 MMHG | RESPIRATION RATE: 18 BRPM | OXYGEN SATURATION: 87 % | TEMPERATURE: 100 F

## 2022-07-30 VITALS
HEART RATE: 95 BPM | HEIGHT: 62.5 IN | RESPIRATION RATE: 18 BRPM | SYSTOLIC BLOOD PRESSURE: 115 MMHG | OXYGEN SATURATION: 96 % | WEIGHT: 147.05 LBS | TEMPERATURE: 100 F | DIASTOLIC BLOOD PRESSURE: 81 MMHG

## 2022-07-30 DIAGNOSIS — Z98.890 OTHER SPECIFIED POSTPROCEDURAL STATES: Chronic | ICD-10-CM

## 2022-07-30 DIAGNOSIS — Z96.649 PRESENCE OF UNSPECIFIED ARTIFICIAL HIP JOINT: Chronic | ICD-10-CM

## 2022-07-30 DIAGNOSIS — U07.1 COVID-19: ICD-10-CM

## 2022-07-30 DIAGNOSIS — Z96.612 PRESENCE OF LEFT ARTIFICIAL SHOULDER JOINT: Chronic | ICD-10-CM

## 2022-07-30 DIAGNOSIS — Z90.721 ACQUIRED ABSENCE OF OVARIES, UNILATERAL: Chronic | ICD-10-CM

## 2022-07-30 RX ORDER — BEBTELOVIMAB 87.5 MG/ML
175 INJECTION, SOLUTION INTRAVENOUS ONCE
Refills: 0 | Status: COMPLETED | OUTPATIENT
Start: 2022-07-30 | End: 2022-07-30

## 2022-07-30 RX ADMIN — BEBTELOVIMAB 175 MILLIGRAM(S): 87.5 INJECTION, SOLUTION INTRAVENOUS at 14:55

## 2022-07-30 NOTE — CHART NOTE - NSCHARTNOTEFT_GEN_A_CORE
CC: Monoclonal Antibody Infusion/COVID 19 Positive  67y Female with PMH of Crohns disease, on Humira and recent dx of COVID 19+ who presents today for elective Bebtelovimab. Patient has been screened and was deemed to be a candidate.    Symptoms/ Criteria  Date of Symptom Onset: 7/26/2022  Symptoms: Cough, Headache, Runny nose  Date of Positive COVID PCR: 7/29/2022  Risk Profile includes: Age>65, Immunocompromised status and use of immunosuppressive drug      Vaccination Status: Received Pfizer COVID vaccie series and one booster dose    PMHx:  No pertinent family history in first degree relatives    Family history of Hodgkin&#x27;s disease (Father)    Infection due to severe acute respiratory syndrome coronavirus 2 (SARS-CoV-2)    AVN (avascular necrosis of bone)    Crohn&#x27;s disease    GERD (gastroesophageal reflux disease)    Anxiety    Lesion of plantar nerve, right lower limb    Hypercholesterolemia    History of palpitations    History of total hip replacement    S/P shoulder replacement, left    History of right oophorectomy    H/O laminectomy    H/O colonoscopy    H/O endoscopy    SysAdmin_VisitLink        Exam/findings:  T(C): 37.7 (07-30-22 @ 14:41), Max: 37.7 (07-30-22 @ 14:41)  HR: 95 (07-30-22 @ 14:41) (95 - 95)  BP: 115/81 (07-30-22 @ 14:41) (115/81 - 115/81)  RR: 18 (07-30-22 @ 14:41) (18 - 18)  SpO2: 96% (07-30-22 @ 14:41) (96% - 96%)    PE:   Appearance: NAD	  HEENT:  NC/AT  Cardiovascular:  No edema  Respiratory: no use of accessory muscles  Gastrointestinal:  non-distended   Skin: warm and dry  Neurologic: Non-focal  Extremities: Normal range of motion    ASSESSMENT:  Pt is COVID positive with mild to moderate symptoms who was referred for elective MAB (Bebtelovimab).    PLAN:  - MAB treatment explained to patient. I have reviewed the Bebtelovimab Emergency Use Authorization (EUA) and I have provided the patient or patient's caregiver with the following information:   1. FDA has authorized emergency use of Bebtelovimab to be administered for the treatment of mild to moderate COVID-19, which is not an FDA-approved biological product.   2. The patient or patient's caregiver has the option to accept or refuse administration of MAB.   3. The significant known and potential risks and benefits of Bebtelovimab and the extent to which such risks and benefits are unknown.  4. Information on available alternative treatments and risks and benefits of those alternatives.  - Patient verbalized understanding of plan and agrees to treatment. All questions answered.  - Consent for MAB obtained.   - 175mg of Bebtelovimab administered as a single intravenous injection over at least 30 seconds.   - Observe patient for one hour post medication administration and then if stable, discharge home with outpatient follow up as planned by PCP.      POST ASSESSMENT:   Patient completed MAB, and monitored x 1 hour post-infusion with no adverse reactions noted, remained hemodynamically stable.  - Patient tolerated infusion well; denies complaints of chest pain/SOB/dizziness/palpitations.   - VSS for discharge home.  - D/C instructions given/ fact sheet included.  - Patient was instructed to self-isolate and use infection control measures (e.g wear mask, isolate, social distance, avoid sharing personal items, clean and disinfect "high touch" surfaces, and frequent handwashing according to the CDC guidelines.   - The patient was informed on what symptoms to be aware of for the next couple of days, and if there are any issues to call the 24/7 clinical call center. Patient was instructed to follow up with primary care provider as needed.    DISCHARGE stable

## 2022-09-04 ENCOUNTER — NON-APPOINTMENT (OUTPATIENT)
Age: 67
End: 2022-09-04

## 2022-09-23 ENCOUNTER — NON-APPOINTMENT (OUTPATIENT)
Age: 67
End: 2022-09-23

## 2022-09-29 ENCOUNTER — APPOINTMENT (OUTPATIENT)
Dept: OTOLARYNGOLOGY | Facility: CLINIC | Age: 67
End: 2022-09-29

## 2022-09-29 VITALS
WEIGHT: 148 LBS | SYSTOLIC BLOOD PRESSURE: 134 MMHG | HEIGHT: 62.5 IN | HEART RATE: 74 BPM | BODY MASS INDEX: 26.55 KG/M2 | DIASTOLIC BLOOD PRESSURE: 86 MMHG

## 2022-09-29 DIAGNOSIS — J32.0 CHRONIC MAXILLARY SINUSITIS: ICD-10-CM

## 2022-09-29 PROCEDURE — 99203 OFFICE O/P NEW LOW 30 MIN: CPT | Mod: 25

## 2022-09-29 PROCEDURE — 31231 NASAL ENDOSCOPY DX: CPT

## 2022-09-29 RX ORDER — METHYLPREDNISOLONE 4 MG/1
4 TABLET ORAL
Qty: 21 | Refills: 1 | Status: DISCONTINUED | COMMUNITY
Start: 2020-02-26 | End: 2022-09-29

## 2022-09-29 RX ORDER — SULFAMETHOXAZOLE AND TRIMETHOPRIM 800; 160 MG/1; MG/1
800-160 TABLET ORAL
Qty: 30 | Refills: 0 | Status: DISCONTINUED | COMMUNITY
Start: 2019-06-07 | End: 2022-09-29

## 2022-09-29 RX ORDER — ESCITALOPRAM OXALATE 10 MG/1
10 TABLET, FILM COATED ORAL
Refills: 0 | Status: ACTIVE | COMMUNITY

## 2022-09-29 RX ORDER — OXYCODONE 5 MG/1
5 TABLET ORAL
Qty: 28 | Refills: 0 | Status: DISCONTINUED | COMMUNITY
Start: 2019-07-31 | End: 2022-09-29

## 2022-09-29 RX ORDER — OXYCODONE 10 MG/1
10 TABLET ORAL EVERY 6 HOURS
Qty: 30 | Refills: 0 | Status: DISCONTINUED | COMMUNITY
Start: 2020-03-02 | End: 2022-09-29

## 2022-09-29 RX ORDER — AMOXICILLIN 500 MG/1
500 CAPSULE ORAL
Qty: 20 | Refills: 0 | Status: DISCONTINUED | COMMUNITY
Start: 2019-08-16 | End: 2022-09-29

## 2022-09-29 RX ORDER — CEPHALEXIN 500 MG/1
500 CAPSULE ORAL
Qty: 30 | Refills: 0 | Status: DISCONTINUED | COMMUNITY
Start: 2019-06-05 | End: 2022-09-29

## 2022-09-29 RX ORDER — DICLOFENAC SODIUM 75 MG/1
75 TABLET, DELAYED RELEASE ORAL
Qty: 60 | Refills: 1 | Status: DISCONTINUED | COMMUNITY
Start: 2020-02-05 | End: 2022-09-29

## 2022-09-29 RX ORDER — OXYCODONE 10 MG/1
10 TABLET ORAL EVERY 6 HOURS
Qty: 20 | Refills: 0 | Status: DISCONTINUED | COMMUNITY
Start: 2020-02-10 | End: 2022-09-29

## 2022-09-29 RX ORDER — MELOXICAM 7.5 MG/1
7.5 TABLET ORAL
Qty: 20 | Refills: 0 | Status: DISCONTINUED | COMMUNITY
Start: 2019-07-31 | End: 2022-09-29

## 2022-09-29 RX ORDER — OXYCODONE AND ACETAMINOPHEN 5; 325 MG/1; MG/1
5-325 TABLET ORAL
Qty: 60 | Refills: 0 | Status: DISCONTINUED | COMMUNITY
Start: 2019-09-03 | End: 2022-09-29

## 2022-09-29 NOTE — REVIEW OF SYSTEMS
[Post Nasal Drip] : post nasal drip [Ear Pain] : ear pain [Ear Itch] : ear itch [Nasal Congestion] : nasal congestion [Sinus Pain] : sinus pain [Sinus Pressure] : sinus pressure [Discolored Nasal Discharge] : discolored nasal discharge [Negative] : Heme/Lymph

## 2022-09-29 NOTE — HISTORY OF PRESENT ILLNESS
[de-identified] : 67 yr old female had Covid 7/29/2022 tx w MAb  (Crohn's)\par had lots of nasal congestion and burning in right nostril, felt better a few days after getting MAb\par \par late August had left cheek, nose and infra-orbital pain and pressure\par 9/5 Urgent Care rx Augmentin and Flonase (never picked up Flonase). Felt better after a few days\par 9/22 symptoms recurred with discolored mucous on the left.  Went back to Urgent Care.  Rec: flonase and mucinexD\par \par +hx right nasal polypectomy 1990's (Dr Valentin)\par no recent sinusitis

## 2022-10-17 ENCOUNTER — APPOINTMENT (OUTPATIENT)
Dept: OTOLARYNGOLOGY | Facility: CLINIC | Age: 67
End: 2022-10-17

## 2022-10-17 VITALS
BODY MASS INDEX: 26.55 KG/M2 | DIASTOLIC BLOOD PRESSURE: 84 MMHG | HEIGHT: 62.5 IN | HEART RATE: 73 BPM | WEIGHT: 148 LBS | SYSTOLIC BLOOD PRESSURE: 143 MMHG

## 2022-10-17 DIAGNOSIS — J31.0 CHRONIC RHINITIS: ICD-10-CM

## 2022-10-17 PROCEDURE — 99213 OFFICE O/P EST LOW 20 MIN: CPT

## 2022-10-17 RX ORDER — FLUTICASONE PROPIONATE 50 UG/1
50 SPRAY, METERED NASAL
Qty: 3 | Refills: 3 | Status: ACTIVE | COMMUNITY
Start: 2022-10-17 | End: 1900-01-01

## 2022-10-17 RX ORDER — AMOXICILLIN AND CLAVULANATE POTASSIUM 875; 125 MG/1; MG/1
875-125 TABLET, COATED ORAL
Qty: 20 | Refills: 0 | Status: DISCONTINUED | COMMUNITY
Start: 2022-09-29 | End: 2022-10-17

## 2022-10-17 NOTE — ASSESSMENT
[FreeTextEntry1] : sinusitis resolved\par continue sinus rinse and flonase\par f/u 1 yr or prn if symptoms recur

## 2022-10-17 NOTE — REVIEW OF SYSTEMS
[Post Nasal Drip] : post nasal drip [Ear Pain] : ear pain [Ear Itch] : ear itch [Negative] : Heme/Lymph [As Noted in HPI] : as noted in HPI

## 2022-10-17 NOTE — HISTORY OF PRESENT ILLNESS
[de-identified] : 67 yr old female had Covid 7/29/2022 tx w MAb  (Crohn's)\par had lots of nasal congestion and burning in right nostril, felt better a few days after getting MAb\par \par late August had left cheek, nose and infra-orbital pain and pressure\par 9/5 Urgent Care rx Augmentin and Flonase (never picked up Flonase). Felt better after a few days\par 9/22 symptoms recurred with discolored mucous on the left.  Went back to Urgent Care.  Rec: flonase and mucinexD\par 9/29 tx w Augmentin, Afrin for 3d, sinus rinse and flonase.  Still using sinus rinse and flonase.  Feels much better\par \par +hx right nasal polypectomy 1990's (Dr Valentin)

## 2022-12-02 ENCOUNTER — NON-APPOINTMENT (OUTPATIENT)
Age: 67
End: 2022-12-02

## 2023-01-05 ENCOUNTER — OFFICE (OUTPATIENT)
Dept: URBAN - METROPOLITAN AREA CLINIC 109 | Facility: CLINIC | Age: 68
Setting detail: OPHTHALMOLOGY
End: 2023-01-05
Payer: MEDICARE

## 2023-01-05 DIAGNOSIS — H02.831: ICD-10-CM

## 2023-01-05 DIAGNOSIS — H53.40: ICD-10-CM

## 2023-01-05 DIAGNOSIS — H02.834: ICD-10-CM

## 2023-01-05 DIAGNOSIS — H02.403: ICD-10-CM

## 2023-01-05 PROCEDURE — 99214 OFFICE O/P EST MOD 30 MIN: CPT | Performed by: OPHTHALMOLOGY

## 2023-01-05 PROCEDURE — SCCOS COSMETIC CONSULTATION: Performed by: OPHTHALMOLOGY

## 2023-01-05 PROCEDURE — 92082 INTERMEDIATE VISUAL FIELD XM: CPT | Performed by: OPHTHALMOLOGY

## 2023-01-05 PROCEDURE — 92285 EXTERNAL OCULAR PHOTOGRAPHY: CPT | Performed by: OPHTHALMOLOGY

## 2023-01-05 ASSESSMENT — REFRACTION_CURRENTRX
OS_CYLINDER: -0.75
OD_CYLINDER: -1.00
OS_AXIS: 88
OS_SPHERE: PLANO
OD_OVR_VA: 20/
OS_OVR_VA: 20/
OD_AXIS: 95
OD_SPHERE: PLANO

## 2023-01-05 ASSESSMENT — LID POSITION - PTOSIS
OD_PTOSIS: RUL 2+
OS_PTOSIS: LUL 2+

## 2023-01-05 ASSESSMENT — VISUAL ACUITY
OD_BCVA: 20/20-2
OS_BCVA: 20/25-2

## 2023-01-05 ASSESSMENT — LID EXAM ASSESSMENTS
OD_LEVATOR_FUNCTION: 15 MM
OS_COMMENTS: 2+ LATERAL FAT PROLAPS
OS_LEVATOR_FUNCTION: 15 MM
OD_COMMENTS: 2+ LATERAL FAT PROLAPS
OD_MRD1: 0 MM
OS_MRD1: 0 MM
OS_LATERAL_FAT_PROLAPSE: 2+
OD_LATERAL_FAT_PROLAPSE: 2+
OD_MEDIAL_FAT_PROLAPSE: 2+
OS_CENTRAL_FAT_PROLAPSE: 2+
OD_CENTRAL_FAT_PROLAPSE: 2+
OS_COMMENTS: 2+ MEDIAL FAT PROLAPSE

## 2023-01-05 ASSESSMENT — CONFRONTATIONAL VISUAL FIELD TEST (CVF)
OS_FINDINGS: FULL
OD_FINDINGS: FULL

## 2023-01-05 ASSESSMENT — LID POSITION - DERMATOCHALASIS
OD_DERMATOCHALASIS: RUL 1+
OS_DERMATOCHALASIS: LUL 1+

## 2023-02-16 ENCOUNTER — OFFICE (OUTPATIENT)
Dept: URBAN - METROPOLITAN AREA CLINIC 109 | Facility: CLINIC | Age: 68
Setting detail: OPHTHALMOLOGY
End: 2023-02-16
Payer: MEDICARE

## 2023-02-16 DIAGNOSIS — H02.831: ICD-10-CM

## 2023-02-16 DIAGNOSIS — H02.834: ICD-10-CM

## 2023-02-16 DIAGNOSIS — H02.403: ICD-10-CM

## 2023-02-16 DIAGNOSIS — H53.40: ICD-10-CM

## 2023-02-16 PROCEDURE — 99213 OFFICE O/P EST LOW 20 MIN: CPT | Performed by: OPHTHALMOLOGY

## 2023-02-17 ASSESSMENT — LID EXAM ASSESSMENTS
OD_COMMENTS: 2+ LATERAL FAT PROLAPS
OS_COMMENTS: 2+ MEDIAL FAT PROLAPSE
OD_LEVATOR_FUNCTION: 15 MM
OD_LATERAL_FAT_PROLAPSE: 2+
OD_MEDIAL_FAT_PROLAPSE: 2+
OD_CENTRAL_FAT_PROLAPSE: 2+
OS_CENTRAL_FAT_PROLAPSE: 2+
OD_MRD1: 0 MM
OS_LATERAL_FAT_PROLAPSE: 2+
OS_LEVATOR_FUNCTION: 15 MM
OS_COMMENTS: 2+ LATERAL FAT PROLAPS
OS_MRD1: 0 MM

## 2023-02-17 ASSESSMENT — LID POSITION - PTOSIS
OS_PTOSIS: LUL 2+
OD_PTOSIS: RUL 2+

## 2023-02-17 ASSESSMENT — LID POSITION - DERMATOCHALASIS
OD_DERMATOCHALASIS: RUL 1+
OS_DERMATOCHALASIS: LUL 1+

## 2023-02-22 ASSESSMENT — VISUAL ACUITY
OD_BCVA: 20/20-2
OS_BCVA: 20/25-2

## 2023-02-23 ENCOUNTER — AMBULATORY SURGERY CENTER (OUTPATIENT)
Dept: URBAN - METROPOLITAN AREA SURGERY 4 | Facility: SURGERY | Age: 68
Setting detail: OPHTHALMOLOGY
End: 2023-02-23
Payer: MEDICARE

## 2023-02-23 DIAGNOSIS — H02.403: ICD-10-CM

## 2023-02-23 PROCEDURE — 15820 BLEPHAROPLASTY LOWER EYELID: CPT | Performed by: OPHTHALMOLOGY

## 2023-02-23 PROCEDURE — 67904 REPAIR EYELID DEFECT: CPT | Performed by: OPHTHALMOLOGY

## 2023-03-02 ENCOUNTER — OFFICE (OUTPATIENT)
Dept: URBAN - METROPOLITAN AREA CLINIC 109 | Facility: CLINIC | Age: 68
Setting detail: OPHTHALMOLOGY
End: 2023-03-02
Payer: MEDICARE

## 2023-03-02 DIAGNOSIS — H02.403: ICD-10-CM

## 2023-03-02 DIAGNOSIS — H02.834: ICD-10-CM

## 2023-03-02 DIAGNOSIS — H02.831: ICD-10-CM

## 2023-03-02 PROCEDURE — 99024 POSTOP FOLLOW-UP VISIT: CPT | Performed by: OPHTHALMOLOGY

## 2023-03-02 ASSESSMENT — LID EXAM ASSESSMENTS
OS_CENTRAL_FAT_PROLAPSE: ABSENT
OD_LATERAL_FAT_PROLAPSE: ABSENT
OS_LATERAL_FAT_PROLAPSE: ABSENT
OD_COMMENTS: ABSENT LATERAL FAT PROLAPS
OD_MRD1: 4 MM
OS_COMMENTS: ABSENT LATERAL FAT PROLAPS
OS_COMMENTS: ABSENT MEDIAL FAT PROLAPSE
OS_MRD1: 4 MM
OS_LEVATOR_FUNCTION: 15 MM
OD_CENTRAL_FAT_PROLAPSE: ABSENT
OD_LEVATOR_FUNCTION: 15 MM
OD_MEDIAL_FAT_PROLAPSE: ABSENT

## 2023-03-02 ASSESSMENT — VISUAL ACUITY
OS_BCVA: 20/20
OD_BCVA: 20/20

## 2023-03-02 ASSESSMENT — CONFRONTATIONAL VISUAL FIELD TEST (CVF)
OD_FINDINGS: FULL
OS_FINDINGS: FULL

## 2023-03-02 ASSESSMENT — LID POSITION - COMMENTS
OS_COMMENTS: WOUND C/D/I, HEALING WELL. GOOD HEIGHT AND GOOD SYMMETRY.
OD_COMMENTS: WOUND C/D/I, HEALING WELL. GOOD HEIGHT AND GOOD SYMMETRY.

## 2023-03-02 ASSESSMENT — LID POSITION - PTOSIS
OS_PTOSIS: ABSENT
OD_PTOSIS: ABSENT

## 2023-03-02 ASSESSMENT — LID POSITION - DERMATOCHALASIS
OD_DERMATOCHALASIS: ABSENT
OS_DERMATOCHALASIS: ABSENT

## 2023-05-04 ENCOUNTER — OFFICE (OUTPATIENT)
Dept: URBAN - METROPOLITAN AREA CLINIC 109 | Facility: CLINIC | Age: 68
Setting detail: OPHTHALMOLOGY
End: 2023-05-04
Payer: MEDICARE

## 2023-05-04 DIAGNOSIS — H02.403: ICD-10-CM

## 2023-05-04 DIAGNOSIS — H02.834: ICD-10-CM

## 2023-05-04 DIAGNOSIS — H02.831: ICD-10-CM

## 2023-05-04 PROCEDURE — 99024 POSTOP FOLLOW-UP VISIT: CPT | Performed by: OPHTHALMOLOGY

## 2023-05-04 ASSESSMENT — LID EXAM ASSESSMENTS
OD_LATERAL_FAT_PROLAPSE: ABSENT
OS_LEVATOR_FUNCTION: 15 MM
OS_COMMENTS: ABSENT LATERAL FAT PROLAPS
OS_LATERAL_FAT_PROLAPSE: ABSENT
OS_MRD1: 4 MM
OS_COMMENTS: ABSENT MEDIAL FAT PROLAPSE
OS_CENTRAL_FAT_PROLAPSE: ABSENT
OD_MEDIAL_FAT_PROLAPSE: ABSENT
OD_MRD1: 4 MM
OD_CENTRAL_FAT_PROLAPSE: ABSENT
OD_COMMENTS: ABSENT LATERAL FAT PROLAPS
OD_LEVATOR_FUNCTION: 15 MM

## 2023-05-04 ASSESSMENT — VISUAL ACUITY
OS_BCVA: 20/20
OD_BCVA: 20/20

## 2023-05-04 ASSESSMENT — CONFRONTATIONAL VISUAL FIELD TEST (CVF)
OS_FINDINGS: FULL
OD_FINDINGS: FULL

## 2023-05-04 ASSESSMENT — LID POSITION - COMMENTS
OD_COMMENTS: GOOD HEIGHT AND GOOD SYMMETRY.
OS_COMMENTS: GOOD HEIGHT AND GOOD SYMMETRY.

## 2023-05-04 ASSESSMENT — LID POSITION - DERMATOCHALASIS
OD_DERMATOCHALASIS: ABSENT
OS_DERMATOCHALASIS: ABSENT

## 2023-05-04 ASSESSMENT — LID POSITION - PTOSIS
OS_PTOSIS: ABSENT
OD_PTOSIS: ABSENT
